# Patient Record
Sex: MALE | Race: WHITE | NOT HISPANIC OR LATINO | Employment: OTHER | URBAN - METROPOLITAN AREA
[De-identification: names, ages, dates, MRNs, and addresses within clinical notes are randomized per-mention and may not be internally consistent; named-entity substitution may affect disease eponyms.]

---

## 2017-01-05 ENCOUNTER — OFFICE VISIT (OUTPATIENT)
Dept: SURGERY | Facility: CLINIC | Age: 72
End: 2017-01-05
Payer: MEDICARE

## 2017-01-05 VITALS
DIASTOLIC BLOOD PRESSURE: 58 MMHG | WEIGHT: 199.94 LBS | HEART RATE: 79 BPM | BODY MASS INDEX: 28.69 KG/M2 | SYSTOLIC BLOOD PRESSURE: 105 MMHG

## 2017-01-05 DIAGNOSIS — Z93.2 ILEOSTOMY STATUS: ICD-10-CM

## 2017-01-05 DIAGNOSIS — Z09 POSTOP CHECK: Primary | ICD-10-CM

## 2017-01-05 PROCEDURE — 99024 POSTOP FOLLOW-UP VISIT: CPT | Mod: ,,, | Performed by: SURGERY

## 2017-01-05 PROCEDURE — 99999 PR PBB SHADOW E&M-EST. PATIENT-LVL III: CPT | Mod: PBBFAC,,, | Performed by: SURGERY

## 2017-01-05 PROCEDURE — 99213 OFFICE O/P EST LOW 20 MIN: CPT | Mod: PBBFAC,PO | Performed by: SURGERY

## 2017-01-05 RX ORDER — METRONIDAZOLE 500 MG/100ML
500 INJECTION, SOLUTION INTRAVENOUS
Status: CANCELLED | OUTPATIENT
Start: 2017-01-05

## 2017-01-05 RX ORDER — SODIUM CHLORIDE 9 MG/ML
INJECTION, SOLUTION INTRAVENOUS CONTINUOUS
Status: CANCELLED | OUTPATIENT
Start: 2017-01-05

## 2017-01-05 NOTE — MR AVS SNAPSHOT
[facility]  [address] [facility] [address]  [place] [address]  Phone: [phone]                  Chester Quinones   2017 3:30 PM   Office Visit    Description:  Male : 1945   Provider:  Hany Pierre MD   Department:  [facility] Surgery           Diagnoses this Visit        Comments    Postop check    -  Primary            To Do List           Future Appointments        Provider Department Dept Phone    2017 12:00 PM LAB, [facility] - Laboratory [phone]      Goals (5 Years of Data)     None      [facility]     [facility] On Call Nurse Care Line -  Assistance  Registered nurses in the [facility] On Call Center provide clinical advisement, health education, appointment booking, and other advisory services.  Call for this free service at [phone].             Medications                Verify that the below list of medications is an accurate representation of the medications you are currently taking.  If none reported, the list may be blank. If incorrect, please contact your healthcare provider. Carry this list with you in case of emergency.           Current Medications     aspirin (ECOTRIN) 81 MG EC tablet Take 81 mg by mouth nightly.     bicalutamide (CASODEX) 50 MG Tab Take 50 mg by mouth once daily.    carbamazepine (TEGRETOL) 200 mg tablet Take 200 mg by mouth 3 (three) times daily as needed.     cyanocobalamin 500 MCG tablet Take 1,000 mcg by mouth once daily.     diazePAM (VALIUM) 10 MG Tab Take 1 tablet (10 mg total) by mouth every 12 (twelve) hours. Takes 5 mg am and 10 mg pm    enzalutamide (XTANDI) 40 mg Cap Take 40 Doses by mouth once daily. 4 caps    fluoxetine (PROZAC) 20 MG capsule Take 20 mg by mouth every evening.     folic acid (FOLVITE) 400 MCG tablet Take 400 mcg by mouth once daily.    gabapentin (NEURONTIN) 600 MG tablet Take 600 mg by mouth 3 (three) times daily. Use prior to surgery    hydrocodone-acetaminophen  5-325mg (NORCO) 5-325 mg per tablet Take 1 tablet by mouth every 4 (four) hours as needed.    leuprolide, 6 month, (ELIGARD) 45 mg (6 month) injection Inject 45 mg into the skin every 3 (three) months.    metoprolol succinate (TOPROL-XL) 25 MG 24 hr tablet Take 25 mg by mouth 2 (two) times daily. 1/2 tab    nitroGLYCERIN (NITROSTAT) 0.4 MG SL tablet Place 0.4 mg under the tongue every 5 (five) minutes as needed for Chest pain.    omeprazole (PRILOSEC) 40 MG capsule Take 40 mg by mouth once daily. Use prior to surgery    oxybutynin (DITROPAN) 5 MG Tab Take 5 mg by mouth 2 (two) times daily. Use prior to surgery    pyridoxine (B-6) 25 MG Tab Take 25 mg by mouth once daily.    pyridoxine, vitamin B6, (VITAMIN B-6) 100 MG Tab Take 100 mg by mouth 2 (two) times daily.    simvastatin (ZOCOR) 20 MG tablet Take 20 mg by mouth every evening. Use another dose in AM for surgery    sulfamethoxazole-trimethoprim 400-80mg (BACTRIM,SEPTRA) 400-80 mg per tablet Take 1 tablet by mouth once daily.     tamsulosin (FLOMAX) 0.4 mg Cp24 Take 0.4 mg by mouth as needed.     temazepam (RESTORIL) 30 mg capsule Take 30 mg by mouth nightly as needed for Insomnia.    amlodipine (NORVASC) 5 MG tablet Take 5 mg by mouth once daily. Use prior to surgery    losartan-hydrochlorothiazide 100-12.5 mg (HYZAAR) 100-12.5 mg Tab Take 1 tablet by mouth once daily. Hold AM of surgery           Clinical Reference Information           Vital Signs - Last Recorded  Most recent update: 1/5/2017  3:19 PM by Hany Pierre MD    BP Pulse Wt BMI       (!) 105/58 79 90.7 kg (199 lb 15.3 oz) 28.69 kg/m2       Blood Pressure          Most Recent Value    BP  (!)  105/58      Allergies as of 1/5/2017     Niacin      Immunizations Administered on Date of Encounter - 1/5/2017     None      Orders Placed During Today's Visit      Normal Orders This Visit    Case Request Operating Room: CLOSURE-ILEOSTOMY       Instructions    Surgery is scheduled for 01/09/17 arrival  time per the preop nurse.  Preop will call from [phone]  Fasting after midnight  Someone to drive you home

## 2017-01-05 NOTE — PROGRESS NOTES
Subjective:       Patient ID: Chester Quinones is a 71 y.o. male.    Chief Complaint: No chief complaint on file.    HPI  72 yo M whom I am familiar with. He is 6 weeks s/p LAR with diverting loop ileostomy for rectal cancer.  Pt presents to discuss reversal.  He is feeling well.  No pain.  Energy and control of ostomy improved with lomotil  Review of Systems   Constitutional: Negative for activity change, appetite change, diaphoresis, fever and unexpected weight change.   Respiratory: Negative for cough, chest tightness and wheezing.    Cardiovascular: Negative for chest pain and palpitations.   Gastrointestinal: Negative for abdominal distention, abdominal pain, anal bleeding, blood in stool, constipation, diarrhea, nausea, rectal pain and vomiting.   Genitourinary: Negative for difficulty urinating, dysuria and hematuria.   Musculoskeletal: Negative for back pain and gait problem.   Neurological: Negative for tremors and seizures.       Objective:      Physical Exam   Constitutional: He is oriented to person, place, and time. He appears well-developed and well-nourished.   HENT:   Head: Normocephalic and atraumatic.   Eyes: Pupils are equal, round, and reactive to light.   Neck: Normal range of motion. Neck supple. No tracheal deviation present. No thyromegaly present.   Cardiovascular: Normal rate, regular rhythm and normal heart sounds.    Pulmonary/Chest: Effort normal and breath sounds normal.   Abdominal: Soft. Bowel sounds are normal. He exhibits no distension and no mass. There is no tenderness. There is no rebound and no guarding. Hernia confirmed negative in the right inguinal area and confirmed negative in the left inguinal area.       Genitourinary: Rectal exam shows no external hemorrhoid, no internal hemorrhoid, no fissure, no mass, no tenderness, anal tone normal and guaiac negative stool.   Neurological: He is alert and oriented to person, place, and time.   Psychiatric: He has a normal mood and  affect.   Vitals reviewed.        Path:  FINAL PATHOLOGIC DIAGNOSIS  1. Rectosigmoid colon, low anterior resection:  INVASIVE ADENOCARCINOMA, LOW-GRADE, 2.8 CM (PATHOLOGIC STAGING: pT2 pN0). SEE SYNOPTIC  REPORT.  NEGATIVE MARGINS OF RESECTION.  TWENTY-SIX LYMPH NODES NEGATIVE FOR METASTATIC CARCINOMA (0/26).    BE: negative for leak or contrast extravastation  Assessment:     Rectal cancer    No diagnosis found.    Plan:       TO OR on Monday for ileostomy reversal.    Risks and benefits of surgery d/w pt and informed consent obtained.

## 2017-01-06 ENCOUNTER — ANESTHESIA EVENT (OUTPATIENT)
Dept: SURGERY | Facility: HOSPITAL | Age: 72
DRG: 331 | End: 2017-01-06
Payer: MEDICARE

## 2017-01-09 ENCOUNTER — HOSPITAL ENCOUNTER (INPATIENT)
Facility: HOSPITAL | Age: 72
LOS: 2 days | Discharge: HOME | DRG: 331 | End: 2017-01-11
Attending: SURGERY | Admitting: SURGERY
Payer: MEDICARE

## 2017-01-09 ENCOUNTER — SURGERY (OUTPATIENT)
Age: 72
End: 2017-01-09

## 2017-01-09 ENCOUNTER — ANESTHESIA (OUTPATIENT)
Dept: SURGERY | Facility: HOSPITAL | Age: 72
DRG: 331 | End: 2017-01-09
Payer: MEDICARE

## 2017-01-09 DIAGNOSIS — Z93.2 STATUS POST ILEOSTOMY: ICD-10-CM

## 2017-01-09 DIAGNOSIS — Z09 POSTOP CHECK: ICD-10-CM

## 2017-01-09 DIAGNOSIS — N35.914 ANTERIOR URETHRAL STRICTURE: Primary | ICD-10-CM

## 2017-01-09 DIAGNOSIS — C20 RECTAL CANCER: ICD-10-CM

## 2017-01-09 DIAGNOSIS — C20 MALIGNANT TUMOR OF RECTUM: ICD-10-CM

## 2017-01-09 PROCEDURE — 37000008 HC ANESTHESIA 1ST 15 MINUTES: Performed by: SURGERY

## 2017-01-09 PROCEDURE — 63600175 PHARM REV CODE 636 W HCPCS: Performed by: ANESTHESIOLOGY

## 2017-01-09 PROCEDURE — 71000033 HC RECOVERY, INTIAL HOUR: Performed by: SURGERY

## 2017-01-09 PROCEDURE — 94761 N-INVAS EAR/PLS OXIMETRY MLT: CPT

## 2017-01-09 PROCEDURE — 94799 UNLISTED PULMONARY SVC/PX: CPT

## 2017-01-09 PROCEDURE — 99900104 DSU ONLY-NO CHARGE-EA ADD'L HR (STAT): Performed by: SURGERY

## 2017-01-09 PROCEDURE — 25000003 PHARM REV CODE 250: Performed by: SURGERY

## 2017-01-09 PROCEDURE — 44620 REPAIR BOWEL OPENING: CPT | Mod: 58,,, | Performed by: SURGERY

## 2017-01-09 PROCEDURE — 25000003 PHARM REV CODE 250: Performed by: ANESTHESIOLOGY

## 2017-01-09 PROCEDURE — 0DBB0ZZ EXCISION OF ILEUM, OPEN APPROACH: ICD-10-PCS | Performed by: SURGERY

## 2017-01-09 PROCEDURE — D9220A PRA ANESTHESIA: Mod: CRNA,,, | Performed by: NURSE ANESTHETIST, CERTIFIED REGISTERED

## 2017-01-09 PROCEDURE — 71000039 HC RECOVERY, EACH ADD'L HOUR: Performed by: SURGERY

## 2017-01-09 PROCEDURE — 27000221 HC OXYGEN, UP TO 24 HOURS

## 2017-01-09 PROCEDURE — 99900103 DSU ONLY-NO CHARGE-INITIAL HR (STAT): Performed by: SURGERY

## 2017-01-09 PROCEDURE — 36000706: Performed by: SURGERY

## 2017-01-09 PROCEDURE — D9220A PRA ANESTHESIA: Mod: ANES,,, | Performed by: ANESTHESIOLOGY

## 2017-01-09 PROCEDURE — 88305 TISSUE EXAM BY PATHOLOGIST: CPT | Performed by: PATHOLOGY

## 2017-01-09 PROCEDURE — 12000002 HC ACUTE/MED SURGE SEMI-PRIVATE ROOM

## 2017-01-09 PROCEDURE — 63600175 PHARM REV CODE 636 W HCPCS: Performed by: SURGERY

## 2017-01-09 PROCEDURE — 37000009 HC ANESTHESIA EA ADD 15 MINS: Performed by: SURGERY

## 2017-01-09 PROCEDURE — 27201423 OPTIME MED/SURG SUP & DEVICES STERILE SUPPLY: Performed by: SURGERY

## 2017-01-09 PROCEDURE — C1765 ADHESION BARRIER: HCPCS | Performed by: SURGERY

## 2017-01-09 PROCEDURE — 36000707: Performed by: SURGERY

## 2017-01-09 PROCEDURE — 25000003 PHARM REV CODE 250: Performed by: NURSE ANESTHETIST, CERTIFIED REGISTERED

## 2017-01-09 PROCEDURE — 63600175 PHARM REV CODE 636 W HCPCS: Performed by: NURSE ANESTHETIST, CERTIFIED REGISTERED

## 2017-01-09 DEVICE — MEMBRANE SEPRAFILM 5 X 6: Type: IMPLANTABLE DEVICE | Site: ABDOMEN | Status: FUNCTIONAL

## 2017-01-09 RX ORDER — DIAZEPAM 5 MG/1
10 TABLET ORAL EVERY 12 HOURS
Status: DISCONTINUED | OUTPATIENT
Start: 2017-01-09 | End: 2017-01-11 | Stop reason: HOSPADM

## 2017-01-09 RX ORDER — NITROGLYCERIN 0.4 MG/1
0.4 TABLET SUBLINGUAL EVERY 5 MIN PRN
Status: DISCONTINUED | OUTPATIENT
Start: 2017-01-09 | End: 2017-01-11 | Stop reason: HOSPADM

## 2017-01-09 RX ORDER — ONDANSETRON HYDROCHLORIDE 2 MG/ML
INJECTION, SOLUTION INTRAMUSCULAR; INTRAVENOUS
Status: DISCONTINUED | OUTPATIENT
Start: 2017-01-09 | End: 2017-01-09

## 2017-01-09 RX ORDER — GLYCOPYRROLATE 0.2 MG/ML
INJECTION INTRAMUSCULAR; INTRAVENOUS
Status: DISCONTINUED | OUTPATIENT
Start: 2017-01-09 | End: 2017-01-09

## 2017-01-09 RX ORDER — PROPOFOL 10 MG/ML
VIAL (ML) INTRAVENOUS
Status: DISCONTINUED | OUTPATIENT
Start: 2017-01-09 | End: 2017-01-09

## 2017-01-09 RX ORDER — LORAZEPAM 2 MG/ML
0.25 INJECTION INTRAMUSCULAR
Status: DISCONTINUED | OUTPATIENT
Start: 2017-01-09 | End: 2017-01-09 | Stop reason: HOSPADM

## 2017-01-09 RX ORDER — HYDROMORPHONE HYDROCHLORIDE 2 MG/ML
1 INJECTION, SOLUTION INTRAMUSCULAR; INTRAVENOUS; SUBCUTANEOUS EVERY 4 HOURS PRN
Status: DISCONTINUED | OUTPATIENT
Start: 2017-01-09 | End: 2017-01-11 | Stop reason: HOSPADM

## 2017-01-09 RX ORDER — HYDROCODONE BITARTRATE AND ACETAMINOPHEN 5; 325 MG/1; MG/1
1 TABLET ORAL EVERY 4 HOURS PRN
Status: DISCONTINUED | OUTPATIENT
Start: 2017-01-09 | End: 2017-01-11 | Stop reason: HOSPADM

## 2017-01-09 RX ORDER — SODIUM CHLORIDE, SODIUM LACTATE, POTASSIUM CHLORIDE, CALCIUM CHLORIDE 600; 310; 30; 20 MG/100ML; MG/100ML; MG/100ML; MG/100ML
50 INJECTION, SOLUTION INTRAVENOUS CONTINUOUS
Status: ACTIVE | OUTPATIENT
Start: 2017-01-09 | End: 2017-01-09

## 2017-01-09 RX ORDER — ONDANSETRON 2 MG/ML
4 INJECTION INTRAMUSCULAR; INTRAVENOUS DAILY PRN
Status: DISCONTINUED | OUTPATIENT
Start: 2017-01-09 | End: 2017-01-09 | Stop reason: HOSPADM

## 2017-01-09 RX ORDER — FENTANYL CITRATE 50 UG/ML
INJECTION, SOLUTION INTRAMUSCULAR; INTRAVENOUS
Status: DISCONTINUED | OUTPATIENT
Start: 2017-01-09 | End: 2017-01-09

## 2017-01-09 RX ORDER — OXYBUTYNIN CHLORIDE 5 MG/1
5 TABLET ORAL 2 TIMES DAILY
Status: DISCONTINUED | OUTPATIENT
Start: 2017-01-09 | End: 2017-01-11 | Stop reason: HOSPADM

## 2017-01-09 RX ORDER — METRONIDAZOLE 500 MG/100ML
500 INJECTION, SOLUTION INTRAVENOUS
Status: COMPLETED | OUTPATIENT
Start: 2017-01-09 | End: 2017-01-09

## 2017-01-09 RX ORDER — ENOXAPARIN SODIUM 100 MG/ML
40 INJECTION SUBCUTANEOUS EVERY 24 HOURS
Status: DISCONTINUED | OUTPATIENT
Start: 2017-01-10 | End: 2017-01-11 | Stop reason: HOSPADM

## 2017-01-09 RX ORDER — MEPERIDINE HYDROCHLORIDE 25 MG/ML
12.5 INJECTION INTRAMUSCULAR; INTRAVENOUS; SUBCUTANEOUS ONCE AS NEEDED
Status: DISCONTINUED | OUTPATIENT
Start: 2017-01-09 | End: 2017-01-09 | Stop reason: HOSPADM

## 2017-01-09 RX ORDER — ZOLPIDEM TARTRATE 5 MG/1
5 TABLET ORAL NIGHTLY
Status: DISCONTINUED | OUTPATIENT
Start: 2017-01-09 | End: 2017-01-11 | Stop reason: HOSPADM

## 2017-01-09 RX ORDER — SODIUM CHLORIDE 9 MG/ML
INJECTION, SOLUTION INTRAVENOUS CONTINUOUS
Status: DISCONTINUED | OUTPATIENT
Start: 2017-01-09 | End: 2017-01-09

## 2017-01-09 RX ORDER — NEOSTIGMINE METHYLSULFATE 1 MG/ML
INJECTION, SOLUTION INTRAVENOUS
Status: DISCONTINUED | OUTPATIENT
Start: 2017-01-09 | End: 2017-01-09

## 2017-01-09 RX ORDER — LIDOCAINE HCL/PF 100 MG/5ML
SYRINGE (ML) INTRAVENOUS
Status: DISCONTINUED | OUTPATIENT
Start: 2017-01-09 | End: 2017-01-09

## 2017-01-09 RX ORDER — ONDANSETRON 2 MG/ML
4 INJECTION INTRAMUSCULAR; INTRAVENOUS EVERY 12 HOURS PRN
Status: DISCONTINUED | OUTPATIENT
Start: 2017-01-09 | End: 2017-01-11 | Stop reason: HOSPADM

## 2017-01-09 RX ORDER — SUCCINYLCHOLINE CHLORIDE 20 MG/ML
INJECTION INTRAMUSCULAR; INTRAVENOUS
Status: DISCONTINUED | OUTPATIENT
Start: 2017-01-09 | End: 2017-01-09

## 2017-01-09 RX ORDER — DEXTROSE, SODIUM CHLORIDE, SODIUM LACTATE, POTASSIUM CHLORIDE, AND CALCIUM CHLORIDE 5; .6; .31; .03; .02 G/100ML; G/100ML; G/100ML; G/100ML; G/100ML
INJECTION, SOLUTION INTRAVENOUS CONTINUOUS
Status: DISCONTINUED | OUTPATIENT
Start: 2017-01-09 | End: 2017-01-11 | Stop reason: HOSPADM

## 2017-01-09 RX ORDER — HYDROMORPHONE HYDROCHLORIDE 2 MG/ML
0.2 INJECTION, SOLUTION INTRAMUSCULAR; INTRAVENOUS; SUBCUTANEOUS EVERY 5 MIN PRN
Status: DISCONTINUED | OUTPATIENT
Start: 2017-01-09 | End: 2017-01-09 | Stop reason: HOSPADM

## 2017-01-09 RX ORDER — LIDOCAINE HYDROCHLORIDE 10 MG/ML
1 INJECTION, SOLUTION EPIDURAL; INFILTRATION; INTRACAUDAL; PERINEURAL ONCE
Status: COMPLETED | OUTPATIENT
Start: 2017-01-09 | End: 2017-01-09

## 2017-01-09 RX ORDER — DIPHENHYDRAMINE HYDROCHLORIDE 50 MG/ML
12.5 INJECTION INTRAMUSCULAR; INTRAVENOUS EVERY 4 HOURS PRN
Status: DISCONTINUED | OUTPATIENT
Start: 2017-01-09 | End: 2017-01-11 | Stop reason: HOSPADM

## 2017-01-09 RX ORDER — BICALUTAMIDE 50 MG/1
50 TABLET, FILM COATED ORAL NIGHTLY
Status: DISCONTINUED | OUTPATIENT
Start: 2017-01-09 | End: 2017-01-11 | Stop reason: HOSPADM

## 2017-01-09 RX ORDER — METRONIDAZOLE 500 MG/100ML
500 INJECTION, SOLUTION INTRAVENOUS
Status: COMPLETED | OUTPATIENT
Start: 2017-01-09 | End: 2017-01-10

## 2017-01-09 RX ORDER — LORAZEPAM 2 MG/ML
0.25 INJECTION INTRAMUSCULAR EVERY 4 HOURS PRN
Status: DISCONTINUED | OUTPATIENT
Start: 2017-01-09 | End: 2017-01-11 | Stop reason: HOSPADM

## 2017-01-09 RX ORDER — ROCURONIUM BROMIDE 10 MG/ML
INJECTION, SOLUTION INTRAVENOUS
Status: DISCONTINUED | OUTPATIENT
Start: 2017-01-09 | End: 2017-01-09

## 2017-01-09 RX ADMIN — PROPOFOL 200 MG: 10 INJECTION, EMULSION INTRAVENOUS at 12:01

## 2017-01-09 RX ADMIN — SODIUM CHLORIDE, SODIUM GLUCONATE, SODIUM ACETATE, POTASSIUM CHLORIDE, MAGNESIUM CHLORIDE, SODIUM PHOSPHATE, DIBASIC, AND POTASSIUM PHOSPHATE: .53; .5; .37; .037; .03; .012; .00082 INJECTION, SOLUTION INTRAVENOUS at 01:01

## 2017-01-09 RX ADMIN — HYDROMORPHONE HYDROCHLORIDE 0.2 MG: 2 INJECTION, SOLUTION INTRAMUSCULAR; INTRAVENOUS; SUBCUTANEOUS at 02:01

## 2017-01-09 RX ADMIN — GLYCOPYRROLATE 0.4 MG: 0.2 INJECTION, SOLUTION INTRAMUSCULAR; INTRAVENOUS at 01:01

## 2017-01-09 RX ADMIN — CEFTRIAXONE 2 G: 2 INJECTION, SOLUTION INTRAVENOUS at 12:01

## 2017-01-09 RX ADMIN — SODIUM CHLORIDE, SODIUM GLUCONATE, SODIUM ACETATE, POTASSIUM CHLORIDE, MAGNESIUM CHLORIDE, SODIUM PHOSPHATE, DIBASIC, AND POTASSIUM PHOSPHATE: .53; .5; .37; .037; .03; .012; .00082 INJECTION, SOLUTION INTRAVENOUS at 12:01

## 2017-01-09 RX ADMIN — HYDROCODONE BITARTRATE AND ACETAMINOPHEN 1 TABLET: 5; 325 TABLET ORAL at 09:01

## 2017-01-09 RX ADMIN — ONDANSETRON 4 MG: 2 INJECTION, SOLUTION INTRAMUSCULAR; INTRAVENOUS at 12:01

## 2017-01-09 RX ADMIN — SUCCINYLCHOLINE CHLORIDE 120 MG: 20 INJECTION, SOLUTION INTRAMUSCULAR; INTRAVENOUS at 12:01

## 2017-01-09 RX ADMIN — BUPIVACAINE 20 ML: 13.3 INJECTION, SUSPENSION, LIPOSOMAL INFILTRATION at 01:01

## 2017-01-09 RX ADMIN — ROCURONIUM BROMIDE 15 MG: 10 INJECTION, SOLUTION INTRAVENOUS at 12:01

## 2017-01-09 RX ADMIN — SODIUM CHLORIDE, SODIUM LACTATE, POTASSIUM CHLORIDE, AND CALCIUM CHLORIDE 50 ML: .6; .31; .03; .02 INJECTION, SOLUTION INTRAVENOUS at 02:01

## 2017-01-09 RX ADMIN — ROCURONIUM BROMIDE 5 MG: 10 INJECTION, SOLUTION INTRAVENOUS at 12:01

## 2017-01-09 RX ADMIN — SODIUM CHLORIDE, SODIUM LACTATE, POTASSIUM CHLORIDE, AND CALCIUM CHLORIDE 50 ML: .6; .31; .03; .02 INJECTION, SOLUTION INTRAVENOUS at 08:01

## 2017-01-09 RX ADMIN — OXYBUTYNIN CHLORIDE 5 MG: 5 TABLET ORAL at 09:01

## 2017-01-09 RX ADMIN — LIDOCAINE HYDROCHLORIDE 100 MG: 20 INJECTION, SOLUTION INTRAVENOUS at 12:01

## 2017-01-09 RX ADMIN — ZOLPIDEM TARTRATE 5 MG: 5 TABLET, FILM COATED ORAL at 09:01

## 2017-01-09 RX ADMIN — NEOSTIGMINE METHYLSULFATE 3 MG: 1 INJECTION INTRAVENOUS at 01:01

## 2017-01-09 RX ADMIN — DEXTROSE, SODIUM CHLORIDE, SODIUM LACTATE, POTASSIUM CHLORIDE, AND CALCIUM CHLORIDE: 5; .6; .31; .03; .02 INJECTION, SOLUTION INTRAVENOUS at 04:01

## 2017-01-09 RX ADMIN — DIAZEPAM 10 MG: 5 TABLET ORAL at 09:01

## 2017-01-09 RX ADMIN — METRONIDAZOLE 500 MG: 500 INJECTION, SOLUTION INTRAVENOUS at 09:01

## 2017-01-09 RX ADMIN — FENTANYL CITRATE 150 MCG: 50 INJECTION, SOLUTION INTRAMUSCULAR; INTRAVENOUS at 12:01

## 2017-01-09 RX ADMIN — METRONIDAZOLE 500 MG: 500 INJECTION, SOLUTION INTRAVENOUS at 01:01

## 2017-01-09 RX ADMIN — FENTANYL CITRATE 50 MCG: 50 INJECTION, SOLUTION INTRAMUSCULAR; INTRAVENOUS at 12:01

## 2017-01-09 RX ADMIN — LIDOCAINE HYDROCHLORIDE 10 MG: 10 INJECTION, SOLUTION EPIDURAL; INFILTRATION; INTRACAUDAL; PERINEURAL at 08:01

## 2017-01-09 RX ADMIN — BICALUTAMIDE 50 MG: 50 TABLET, FILM COATED ORAL at 09:01

## 2017-01-09 RX ADMIN — GLYCOPYRROLATE 0.2 MG: 0.2 INJECTION, SOLUTION INTRAMUSCULAR; INTRAVENOUS at 01:01

## 2017-01-09 NOTE — ANESTHESIA PREPROCEDURE EVALUATION
01/09/2017  Chester Quinones is a 71 y.o., male.    [facility] Anesthesia Evaluation    I have reviewed the Patient Summary Reports.    I have reviewed the Nursing Notes.      Review of Systems  Anesthesia Hx:  No problems with previous Anesthesia    Cardiovascular:   Hypertension, well controlled Past MI CAD asymptomatic CABG/stent  CAD - MI > 10 years ago with stent - stable   Pulmonary:   COPD, mild    Musculoskeletal:   Pt claims he has been feeling weak since his ileostomy surgeyr - ambulatory but sometimes uses wheelchair       Physical Exam  General:  Well nourished                 Anesthesia Plan  Type of Anesthesia, risks & benefits discussed:  Anesthesia Type:  general  Patient's Preference:   Intra-op Monitoring Plan:   Intra-op Monitoring Plan Comments:   Post Op Pain Control Plan:   Post Op Pain Control Plan Comments:   Induction:   IV  Beta Blocker:  Patient is not currently on a Beta-Blocker (No further documentation required).       Informed Consent: Patient understands risks and agrees with Anesthesia plan.  Questions answered. Anesthesia consent signed with patient.  ASA Score: 3     Day of Surgery Review of History & Physical:    H&P update referred to the surgeon.         Ready For Surgery From Anesthesia Perspective.

## 2017-01-09 NOTE — INTERVAL H&P NOTE
The patient has been examined and the H&P has been reviewed:    I concur with the findings and no changes have occurred since H&P was written.    Anesthesia/Surgery risks, benefits and alternative options discussed and understood by patient/family.          Active Hospital Problems    Diagnosis  POA    Postop check [Z09]  Yes      Resolved Hospital Problems    Diagnosis Date Resolved POA   No resolved problems to display.

## 2017-01-09 NOTE — TRANSFER OF CARE
"Anesthesia Transfer of Care Note    Patient: Chester Quinones    Procedure(s) Performed: Procedure(s) (LRB):  CLOSURE-ILEOSTOMY (N/A)    Patient location: PACU    Anesthesia Type: general    Transport from OR: Transported from OR on 2-3 L/min O2 by NC with adequate spontaneous ventilation    Post pain: adequate analgesia    Post assessment: no apparent anesthetic complications and tolerated procedure well    Post vital signs: stable    Level of consciousness: awake    Nausea/Vomiting: no nausea/vomiting    Complications: none          Last vitals:   Visit Vitals    /65    Pulse (!) 54    Temp 36.6 °C (97.9 °F) (Oral)    Resp 16    Ht 5' 10" (1.778 m)    Wt 88.9 kg (196 lb)    SpO2 100%    BMI 28.12 kg/m2     "

## 2017-01-09 NOTE — IP AVS SNAPSHOT
[address] [place] [address]  Phone: [phone]           I have received a copy of my After Visit Summary and discharge instructions from [facility].    INSTRUCTIONS RECEIVED AND UNDERSTOOD BY:                     Patient/Patient Representative: ________________________________________________________________     Date/Time: ________________________________________________________________                     Instructions Given By: ________________________________________________________________     Date/Time: ________________________________________________________________

## 2017-01-09 NOTE — H&P (VIEW-ONLY)
Subjective:       Patient ID: Chester Quinones is a 71 y.o. male.    Chief Complaint: No chief complaint on file.    HPI  70 yo M whom I am familiar with. He is 6 weeks s/p LAR with diverting loop ileostomy for rectal cancer.  Pt presents to discuss reversal.  He is feeling well.  No pain.  Energy and control of ostomy improved with lomotil  Review of Systems   Constitutional: Negative for activity change, appetite change, diaphoresis, fever and unexpected weight change.   Respiratory: Negative for cough, chest tightness and wheezing.    Cardiovascular: Negative for chest pain and palpitations.   Gastrointestinal: Negative for abdominal distention, abdominal pain, anal bleeding, blood in stool, constipation, diarrhea, nausea, rectal pain and vomiting.   Genitourinary: Negative for difficulty urinating, dysuria and hematuria.   Musculoskeletal: Negative for back pain and gait problem.   Neurological: Negative for tremors and seizures.       Objective:      Physical Exam   Constitutional: He is oriented to person, place, and time. He appears well-developed and well-nourished.   HENT:   Head: Normocephalic and atraumatic.   Eyes: Pupils are equal, round, and reactive to light.   Neck: Normal range of motion. Neck supple. No tracheal deviation present. No thyromegaly present.   Cardiovascular: Normal rate, regular rhythm and normal heart sounds.    Pulmonary/Chest: Effort normal and breath sounds normal.   Abdominal: Soft. Bowel sounds are normal. He exhibits no distension and no mass. There is no tenderness. There is no rebound and no guarding. Hernia confirmed negative in the right inguinal area and confirmed negative in the left inguinal area.       Genitourinary: Rectal exam shows no external hemorrhoid, no internal hemorrhoid, no fissure, no mass, no tenderness, anal tone normal and guaiac negative stool.   Neurological: He is alert and oriented to person, place, and time.   Psychiatric: He has a normal mood and  affect.   Vitals reviewed.        Path:  FINAL PATHOLOGIC DIAGNOSIS  1. Rectosigmoid colon, low anterior resection:  INVASIVE ADENOCARCINOMA, LOW-GRADE, 2.8 CM (PATHOLOGIC STAGING: pT2 pN0). SEE SYNOPTIC  REPORT.  NEGATIVE MARGINS OF RESECTION.  TWENTY-SIX LYMPH NODES NEGATIVE FOR METASTATIC CARCINOMA (0/26).    BE: negative for leak or contrast extravastation  Assessment:     Rectal cancer    No diagnosis found.    Plan:       TO OR on Monday for ileostomy reversal.    Risks and benefits of surgery d/w pt and informed consent obtained.

## 2017-01-09 NOTE — PLAN OF CARE
Pt in pre op. Denies pain has urostomy on right side of abd. Has supra pubic cath with leg bag on left side.  Wife at bedside. willcontinur to monitor

## 2017-01-09 NOTE — PLAN OF CARE
01/09/17 1714   Vitals   Pulse (!) 59   SpO2 100 %   Pulse Oximetry Type Intermittent   All Fields Breath Sounds clear   Positioning HOB elevated 30 degrees   Incentive Spirometer   Predicted Level (mL) (Incentive Spirometer) 2900   $ Administration (Incentive Spirometer) done with encouragement   Number of Repetitions (Incentive Spirometer) 10   Level (mL) (Incentive Spirometer) 2000   Pt assessed, no distress noted. Post op instruction for IS, performs IS well.

## 2017-01-09 NOTE — BRIEF OP NOTE
[facility]  Brief Operative Note     SUMMARY     Surgery Date: 1/9/2017     Surgeon(s) and Role:     * Hany Pierre MD - Primary    Assistant: Priscilla [place]    Pre-op Diagnosis:  Ileostomy status [Z93.2]    Post-op Diagnosis:  Post-Op Diagnosis Codes:     * Ileostomy status [Z93.2]    Procedure(s) (LRB):  CLOSURE-ILEOSTOMY (N/A)   Small bowel resection    Anesthesia: General    Description of the findings of the procedure: Loop ileostomy.  Minimal intraabdominal adhesions    Findings/Key Components: see above.       Estimated Blood Loss: 25 cc's         Specimens:   Specimen (12h ago through future)    Start     Ordered    01/09/17 1338  Specimen to Pathology - Surgery  Once     Comments:  Pre-op Diagnosis: Ileostomy status [Z93.2]    Post-op Diagnosis: same  Procedure(s):  CLOSURE-ILEOSTOMY  EXPLORATORY-LAPAROTOMY     Number of specimens: 1    Name of specimens: ileostomy    01/09/17 2404

## 2017-01-09 NOTE — IP AVS SNAPSHOT
[address] [place] [address]  Phone: [phone]           Patient Discharge Instructions     Our goal is to set you up for success. This packet includes information on your condition, medications, and your home care. It will help you to care for yourself so you don't get sicker and need to go back to the hospital.     Please ask your nurse if you have any questions.        There are many details to remember when preparing to leave the hospital. Here is what you will need to do:    1. Take your medicine. If you are prescribed medications, review your Medication List in the following pages. You may have new medications to  at the pharmacy and others that you'll need to stop taking. Review the instructions for how and when to take your medications. Talk with your doctor or nurses if you are unsure of what to do.     2. Go to your follow-up appointments. Specific follow-up information is listed in the following pages. Your may be contacted by a transition nurse or clinical provider about future appointments. Be sure we have all of the phone numbers to reach you, if needed. Please contact your provider's office if you are unable to make an appointment.     3. Watch for warning signs. Your doctor or nurse will give you detailed warning signs to watch for and when to call for assistance. These instructions may also include educational information about your condition. If you experience any of warning signs to your health, call your doctor.               [facility]  Unless otherwise directed by your provider, please contact [facility], our nurse care line that is available for 24/7 assistance.     [phone] (toll-free)    Registered nurses in the [facility] provide clinical advisement, health education, appointment booking, and other advisory services.                    ** Verify the list of medication(s) below is accurate and up to date.  Carry this with you in case of emergency. If your medications have changed, please notify your healthcare provider.             Medication List      CHANGE how you take these medications        Additional Info                      * hydrocodone-acetaminophen 5-325mg 5-325 mg per tablet   Commonly known as:  NORCO   Quantity:  30 tablet   Refills:  0   Dose:  1 tablet   What changed:  Another medication with the same name was added. Make sure you understand how and when to take each.    Last time this was given:  1 tablet on 1/11/2017  8:39 AM   Instructions:  Take 1 tablet by mouth every 4 (four) hours as needed.     Begin Date    AM    Noon    PM    Bedtime       * hydrocodone-acetaminophen 5-325mg 5-325 mg per tablet   Commonly known as:  NORCO   Quantity:  30 tablet   Refills:  0   Dose:  1 tablet   What changed:  You were already taking a medication with the same name, and this prescription was added. Make sure you understand how and when to take each.    Last time this was given:  1 tablet on 1/11/2017  8:39 AM   Instructions:  Take 1 tablet by mouth every 4 (four) hours as needed.     Begin Date    AM    Noon    PM    Bedtime       * Notice:  This list has 2 medication(s) that are the same as other medications prescribed for you. Read the directions carefully, and ask your doctor or other care provider to review them with you.      CONTINUE taking these medications        Additional Info                      aspirin 81 MG EC tablet   Commonly known as:  ECOTRIN   Refills:  0   Dose:  81 mg    Instructions:  Take 81 mg by mouth nightly.     Begin Date    AM    Noon    PM    Bedtime       bicalutamide 50 MG Tab   Commonly known as:  CASODEX   Refills:  0   Dose:  50 mg    Last time this was given:  50 mg on 1/10/2017  9:00 PM   Instructions:  Take 50 mg by mouth once daily.     Begin Date    AM    Noon    PM    Bedtime       carbamazepine 200 mg tablet   Commonly known as:  TEGRETOL   Refills:  0   Dose:  200 mg    Indications:  Trigeminal Neuralgia    Instructions:  Take 200 mg by mouth 3 (three) times daily as needed.     Begin Date    AM    Noon    PM    Bedtime       cyanocobalamin 500 MCG tablet   Refills:  0   Dose:  1000 mcg    Instructions:  Take 1,000 mcg by mouth once daily.     Begin Date    AM    Noon    PM    Bedtime       diazePAM 10 MG Tab   Commonly known as:  VALIUM   Quantity:  60 tablet   Refills:  1   Dose:  10 mg    Last time this was given:  10 mg on 1/11/2017  8:39 AM   Instructions:  Take 1 tablet (10 mg total) by mouth every 12 (twelve) hours. Takes 5 mg am and 10 mg pm     Begin Date    AM    Noon    PM    Bedtime       fluoxetine 20 MG capsule   Commonly known as:  PROZAC   Refills:  4   Dose:  20 mg    Instructions:  Take 20 mg by mouth every evening.     Begin Date    AM    Noon    PM    Bedtime       folic acid 400 MCG tablet   Commonly known as:  FOLVITE   Refills:  0   Dose:  400 mcg    Instructions:  Take 400 mcg by mouth once daily.     Begin Date    AM    Noon    PM    Bedtime       gabapentin 600 MG tablet   Commonly known as:  NEURONTIN   Refills:  0   Dose:  600 mg    Instructions:  Take 600 mg by mouth 3 (three) times daily. Use prior to surgery     Begin Date    AM    Noon    PM    Bedtime       leuprolide (6 month) 45 mg injection   Commonly known as:  ELIGARD   Refills:  0   Dose:  45 mg   Indications:  advanced prostatic carcinoma    Instructions:  Inject 45 mg into the skin every 3 (three) months.     Begin Date    AM    Noon    PM    Bedtime       nitroGLYCERIN 0.4 MG SL tablet   Commonly known as:  NITROSTAT   Refills:  0   Dose:  0.4 mg    Instructions:  Place 0.4 mg under the tongue every 5 (five) minutes as needed for Chest pain.     Begin Date    AM    Noon    PM    Bedtime       omeprazole 40 MG capsule   Commonly known as:  PRILOSEC   Refills:  0   Dose:  40 mg    Instructions:  Take 40 mg by mouth once daily. Use prior to surgery     Begin Date    AM    Noon    PM     Bedtime       oxybutynin 5 MG Tab   Commonly known as:  DITROPAN   Refills:  0   Dose:  5 mg    Last time this was given:  5 mg on 1/11/2017  8:39 AM   Instructions:  Take 5 mg by mouth 2 (two) times daily. Use prior to surgery     Begin Date    AM    Noon    PM    Bedtime       * pyridoxine (vitamin B6) 25 MG Tab   Commonly known as:  B-6   Refills:  0   Dose:  25 mg    Instructions:  Take 25 mg by mouth once daily.     Begin Date    AM    Noon    PM    Bedtime       * VITAMIN B-6 100 MG Tab   Refills:  0   Dose:  100 mg   Generic drug:  pyridoxine (vitamin B6)    Instructions:  Take 100 mg by mouth 2 (two) times daily.     Begin Date    AM    Noon    PM    Bedtime       RESTORIL 30 mg capsule   Refills:  0   Dose:  30 mg   Generic drug:  temazepam    Instructions:  Take 30 mg by mouth nightly as needed for Insomnia.     Begin Date    AM    Noon    PM    Bedtime       simvastatin 20 MG tablet   Commonly known as:  ZOCOR   Refills:  0   Dose:  20 mg    Instructions:  Take 20 mg by mouth every evening. Use another dose in AM for surgery     Begin Date    AM    Noon    PM    Bedtime       sulfamethoxazole-trimethoprim 400-80mg 400-80 mg per tablet   Commonly known as:  BACTRIM,SEPTRA   Refills:  0   Dose:  1 tablet    Instructions:  Take 1 tablet by mouth once daily.     Begin Date    AM    Noon    PM    Bedtime       tamsulosin 0.4 mg Cp24   Commonly known as:  FLOMAX   Refills:  0   Dose:  0.4 mg    Instructions:  Take 0.4 mg by mouth as needed.     Begin Date    AM    Noon    PM    Bedtime       XTANDI 40 mg Cap   Refills:  0   Dose:  40 Dose   Generic drug:  enzalutamide    Last time this was given:  160 mg on 1/10/2017  9:00 PM   Instructions:  Take 40 Doses by mouth once daily. 4 caps     Begin Date    AM    Noon    PM    Bedtime       * Notice:  This list has 2 medication(s) that are the same as other medications prescribed for you. Read the directions carefully, and ask your doctor or other care provider to  review them with you.      ASK your doctor about these medications        Additional Info                      metoprolol succinate 25 MG 24 hr tablet   Commonly known as:  TOPROL-XL   Refills:  0   Dose:  25 mg    Instructions:  Take 25 mg by mouth 2 (two) times daily. 1/2 tab     Begin Date    AM    Noon    PM    Bedtime            Where to Get Your Medications      These medications were sent to [address] [address]     Phone:  [phone]     hydrocodone-acetaminophen 5-325mg 5-325 mg per tablet                  Please bring to all follow up appointments:    1. A copy of your discharge instructions.  2. All medicines you are currently taking in their original bottles.  3. Identification and insurance card.    Please arrive 15 minutes ahead of scheduled appointment time.    Please call 24 hours in advance if you must reschedule your appointment and/or time.        Your Scheduled Appointments     Feb 07, 2017 12:00 PM CST   Non-Fasting Lab with [facility] ([facility]) [address] [place] [address]   [phone]            Feb 07, 2017  1:00 PM CST   Established Patient Visit with Apolinar White MD   [facility] ([facility])    [address] [place] [id number] [phone]              Follow-up Information     Follow up with Hany Pierre MD In 2 weeks.    Specialties:  General Surgery, Colon and Rectal Surgery, Surgery    Contact information:    [address] [facility] [address]  [phone]          Discharge Instructions     Future Orders    Call MD for:  difficulty breathing or increased cough     Call MD for:  increased confusion or weakness     Call MD for:  persistent dizziness, light-headedness, or visual disturbances     Call MD for:  persistent nausea and vomiting or diarrhea     Call MD  "for:  redness, tenderness, or signs of infection (pain, swelling, redness, odor or green/yellow discharge around incision site)     Call MD for:  severe persistent headache     Call MD for:  severe uncontrolled pain     Call MD for:  temperature >100.4     Call MD for:  worsening rash     Diet general     Questions:    Total calories:      Fat restriction, if any:      Protein restriction, if any:      Na restriction, if any:      Fluid restriction:      Additional restrictions:      Lifting restrictions         Primary Diagnosis     Your primary diagnosis was:  Rectal Cancer      Admission Information     Date & Time Provider Department CSN    1/9/2017  7:40 AM Hany Pierre MD [facility] [id number]      Care Providers     Provider Role Specialty Primary office phone    Hany Pierre MD Attending Provider General Surgery [phone]    Hany Pierre MD Surgeon  General Surgery [phone]      Your Vitals Were     BP Pulse Temp Resp Height Weight    158/74 73 98.1 °F (36.7 °C) (Oral) 20 5' 10" (1.778 m) 88.9 kg (196 lb)    SpO2 BMI             98% 28.12 kg/m2         Recent Lab Values     No lab values to display.      Pending Labs     Order Current Status    Specimen to Pathology - Surgery In process      Allergies as of 1/11/2017        Reactions    Niacin Rash      Advance Directives     An advance directive is a document which, in the event you are no longer able to make decisions for yourself, tells your healthcare team what kind of treatment you do or do not want to receive, or who you would like to make those decisions for you.  If you do not currently have an advance directive, [facility] encourages you to create one.  For more information call:  [phone]), [phone] ([phone]),  or log on to [email or web address].        Smoking Cessation     If you would like to quit smoking:   You may be eligible for free services if you are a [place] resident and " started smoking cigarettes before September 1, 1988.  Call the Smoking Cessation Trust (SCT) toll free at (996) 112-1629 or (724) 586-1449.   Call 1-800-QUIT-NOW if you do not meet the above criteria.            Language Assistance Services     ATTENTION: Language assistance services are available, free of charge. Please call [phone].      ATENCIÓN: Si habla español, tiene a [place] disposición servicios gratuitos de asistencia lingüística. Llame al [phone].     CHÚ Ý: N?u b?n nói Ti?ng Vi?t, có các d?ch v? h? tr? ngôn ng? mi?n phí dành cho b?n. G?i s? [phone].         [facility] complies with applicable Federal civil rights laws and does not discriminate on the basis of race, color, national origin, age, disability, or sex.

## 2017-01-09 NOTE — PLAN OF CARE
Patient restiing quietly, c/o pain.  Patient medicated w/IV hydromorphone.  SP catheter to leg bag w/robe urine, adequate output.  Plan of care discussed w/patient and wife.  Awaiting private room placement.

## 2017-01-09 NOTE — PLAN OF CARE
1015- checked illeostomy bag, not much in it, but emptied urostomy bag of 100 cc clear yellow urine, before going back to OR  1130- emptied urostomy bag of 100 cc clear yellow urine

## 2017-01-09 NOTE — ANESTHESIA POSTPROCEDURE EVALUATION
"Anesthesia Post Evaluation    Patient: Chester Quinones    Procedure(s) Performed: Procedure(s) (LRB):  CLOSURE-ILEOSTOMY (N/A)    Final Anesthesia Type: general  Patient location during evaluation: PACU  Patient participation: Yes- Able to Participate  Level of consciousness: awake and alert  Post-procedure vital signs: reviewed and stable  Pain management: adequate  Airway patency: patent  PONV status at discharge: No PONV  Anesthetic complications: no      Cardiovascular status: blood pressure returned to baseline and hemodynamically stable  Respiratory status: unassisted  Hydration status: euvolemic  Follow-up not needed.        Visit Vitals    /65    Pulse (!) 54    Temp 36.6 °C (97.9 °F) (Oral)    Resp 16    Ht 5' 10" (1.778 m)    Wt 88.9 kg (196 lb)    SpO2 100%    BMI 28.12 kg/m2       Pain/Gudelia Score: Pain Assessment Performed: Yes (1/9/2017  8:29 AM)  Presence of Pain: denies (1/9/2017  8:29 AM)  Pain Rating Prior to Med Admin: 0 (1/9/2017  8:29 AM)  Pain Rating Post Med Admin: 0 (1/9/2017  8:29 AM)      "

## 2017-01-10 LAB
ANION GAP SERPL CALC-SCNC: 9 MMOL/L
BASOPHILS # BLD AUTO: 0 K/UL
BASOPHILS NFR BLD: 0.6 %
BUN SERPL-MCNC: 10 MG/DL
CALCIUM SERPL-MCNC: 8.7 MG/DL
CHLORIDE SERPL-SCNC: 106 MMOL/L
CO2 SERPL-SCNC: 24 MMOL/L
CREAT SERPL-MCNC: 0.8 MG/DL
DIFFERENTIAL METHOD: ABNORMAL
EOSINOPHIL # BLD AUTO: 0.1 K/UL
EOSINOPHIL NFR BLD: 2.7 %
ERYTHROCYTE [DISTWIDTH] IN BLOOD BY AUTOMATED COUNT: 16.2 %
EST. GFR  (AFRICAN AMERICAN): >60 ML/MIN/1.73 M^2
EST. GFR  (NON AFRICAN AMERICAN): >60 ML/MIN/1.73 M^2
GLUCOSE SERPL-MCNC: 119 MG/DL
HCT VFR BLD AUTO: 29.7 %
HGB BLD-MCNC: 10.3 G/DL
LYMPHOCYTES # BLD AUTO: 0.4 K/UL
LYMPHOCYTES NFR BLD: 8.7 %
MCH RBC QN AUTO: 30.5 PG
MCHC RBC AUTO-ENTMCNC: 34.8 %
MCV RBC AUTO: 88 FL
MONOCYTES # BLD AUTO: 0.4 K/UL
MONOCYTES NFR BLD: 8.8 %
NEUTROPHILS # BLD AUTO: 3.9 K/UL
NEUTROPHILS NFR BLD: 79.2 %
PLATELET # BLD AUTO: 145 K/UL
PMV BLD AUTO: 7.3 FL
POTASSIUM SERPL-SCNC: 3.9 MMOL/L
RBC # BLD AUTO: 3.38 M/UL
SODIUM SERPL-SCNC: 139 MMOL/L
WBC # BLD AUTO: 4.9 K/UL

## 2017-01-10 PROCEDURE — 25000003 PHARM REV CODE 250: Performed by: SURGERY

## 2017-01-10 PROCEDURE — 36415 COLL VENOUS BLD VENIPUNCTURE: CPT

## 2017-01-10 PROCEDURE — 94799 UNLISTED PULMONARY SVC/PX: CPT

## 2017-01-10 PROCEDURE — 80048 BASIC METABOLIC PNL TOTAL CA: CPT

## 2017-01-10 PROCEDURE — 85025 COMPLETE CBC W/AUTO DIFF WBC: CPT

## 2017-01-10 PROCEDURE — 12000002 HC ACUTE/MED SURGE SEMI-PRIVATE ROOM

## 2017-01-10 PROCEDURE — 63600175 PHARM REV CODE 636 W HCPCS: Performed by: SURGERY

## 2017-01-10 PROCEDURE — 94761 N-INVAS EAR/PLS OXIMETRY MLT: CPT

## 2017-01-10 RX ADMIN — HYDROMORPHONE HYDROCHLORIDE 1 MG: 2 INJECTION, SOLUTION INTRAMUSCULAR; INTRAVENOUS; SUBCUTANEOUS at 12:01

## 2017-01-10 RX ADMIN — ZOLPIDEM TARTRATE 5 MG: 5 TABLET, FILM COATED ORAL at 08:01

## 2017-01-10 RX ADMIN — DEXTROSE, SODIUM CHLORIDE, SODIUM LACTATE, POTASSIUM CHLORIDE, AND CALCIUM CHLORIDE: 5; .6; .31; .03; .02 INJECTION, SOLUTION INTRAVENOUS at 08:01

## 2017-01-10 RX ADMIN — DIAZEPAM 10 MG: 5 TABLET ORAL at 08:01

## 2017-01-10 RX ADMIN — HYDROCODONE BITARTRATE AND ACETAMINOPHEN 1 TABLET: 5; 325 TABLET ORAL at 11:01

## 2017-01-10 RX ADMIN — HYDROCODONE BITARTRATE AND ACETAMINOPHEN 1 TABLET: 5; 325 TABLET ORAL at 06:01

## 2017-01-10 RX ADMIN — DEXTROSE, SODIUM CHLORIDE, SODIUM LACTATE, POTASSIUM CHLORIDE, AND CALCIUM CHLORIDE: 5; .6; .31; .03; .02 INJECTION, SOLUTION INTRAVENOUS at 12:01

## 2017-01-10 RX ADMIN — OXYBUTYNIN CHLORIDE 5 MG: 5 TABLET ORAL at 08:01

## 2017-01-10 RX ADMIN — BICALUTAMIDE 50 MG: 50 TABLET, FILM COATED ORAL at 09:01

## 2017-01-10 RX ADMIN — ENOXAPARIN SODIUM 40 MG: 100 INJECTION SUBCUTANEOUS at 11:01

## 2017-01-10 RX ADMIN — DEXTROSE, SODIUM CHLORIDE, SODIUM LACTATE, POTASSIUM CHLORIDE, AND CALCIUM CHLORIDE: 5; .6; .31; .03; .02 INJECTION, SOLUTION INTRAVENOUS at 07:01

## 2017-01-10 RX ADMIN — HYDROCODONE BITARTRATE AND ACETAMINOPHEN 1 TABLET: 5; 325 TABLET ORAL at 10:01

## 2017-01-10 RX ADMIN — METRONIDAZOLE 500 MG: 500 INJECTION, SOLUTION INTRAVENOUS at 04:01

## 2017-01-10 RX ADMIN — HYDROCODONE BITARTRATE AND ACETAMINOPHEN 1 TABLET: 5; 325 TABLET ORAL at 04:01

## 2017-01-10 RX ADMIN — HYDROMORPHONE HYDROCHLORIDE 1 MG: 2 INJECTION, SOLUTION INTRAMUSCULAR; INTRAVENOUS; SUBCUTANEOUS at 01:01

## 2017-01-10 NOTE — PROGRESS NOTES
POD 1 s/p ileostomy reversal.  Pt resting comfortabl.  Minimal abd pain. No n/v.  Nof ever/chills    NO flatus or BM    Wt Readings from Last 3 Encounters:   01/09/17 88.9 kg (196 lb)   01/05/17 90.7 kg (199 lb 15.3 oz)   01/05/17 90.7 kg (200 lb)     Temp Readings from Last 3 Encounters:   01/10/17 98.1 °F (36.7 °C) (Oral)   01/05/17 98.8 °F (37.1 °C) (Oral)   12/16/16 97.8 °F (36.6 °C) (Oral)     BP Readings from Last 3 Encounters:   01/10/17 (!) 142/65   01/05/17 (!) 105/58   01/05/17 (!) 107/51     Pulse Readings from Last 3 Encounters:   01/10/17 73   01/05/17 79   01/05/17 74     AAOx3  CTA B  Sinus  Soft/nd/nt +BS  2+ pulses B    Lab Results   Component Value Date    WBC 4.90 01/10/2017    HGB 10.3 (L) 01/10/2017    HCT 29.7 (L) 01/10/2017    MCV 88 01/10/2017     (L) 01/10/2017     BMP  Lab Results   Component Value Date     01/10/2017    K 3.9 01/10/2017     01/10/2017    CO2 24 01/10/2017    BUN 10 01/10/2017    CREATININE 0.8 01/10/2017    CALCIUM 8.7 01/10/2017    ANIONGAP 9 01/10/2017    ESTGFRAFRICA >60 01/10/2017    EGFRNONAA >60 01/10/2017     A/P: POD 1 s/p ileostomy reversal.   Ambulate   Await bowel function  Possible d/c today

## 2017-01-10 NOTE — PROGRESS NOTES
01/10/17 1447   Oxygen Therapy   SpO2 98 %   Pulse Oximetry Type Intermittent   Oxygen Concentration (%) 21   O2 Device (Oxygen Therapy) room air   IS at bedside. Encouraged use.

## 2017-01-10 NOTE — PLAN OF CARE
Problem: Patient Care Overview  Goal: Plan of Care Review  Outcome: Ongoing (interventions implemented as appropriate)  Patient pain well controlled on current regimen. x1 dose IV dilaudid administered today for breakthrough pain uncontrolled w/ PO Norco. Patient encouraged to spend time OOB. Originally, goal was OOB for lunch/sitting up in chair. Patient unable to tolerate sitting up in chair, but patient took 40 steps in room w/ RN assistance, wife with standby assist. Wife of pt attentive to care. Patient anticipating discharge in AM after d/w Dr Pierre.     No significant events this shift.

## 2017-01-10 NOTE — PROGRESS NOTES
8:30 AM  Dr Pierre at patient's bedside now- performing drsg change at this time, packing removal and replacement at this time. Dressed w/ bordered gauze.     Dr Pierre discussed POC w/ patient and wife. Both report comfortable discharging in the morning. Dr Pierre states patient not required to have BM prior to DC, anticipating BM in 2-4 days more until BM occurs.

## 2017-01-10 NOTE — OP NOTE
DATE OF PROCEDURE:  01/09/2017    STAFF SURGEON:  Hany Pierre M.D.    PREOPERATIVE DIAGNOSIS:  Ileostomy.    POSTOPERATIVE DIAGNOSIS:  Ileostomy.    PROCEDURE:  Ileostomy reversal.    ASSISTANT:  ANANT Becerril.    ANESTHESIA:  General endotracheal anesthesia.    INDICATION FOR PROCEDURE:  A pleasant 71-year-old gentleman with history of   rectal cancer.  He is six weeks status post LAR with diverting loop ileostomy.    He was scheduled for ileostomy reversal on above-mentioned date.    DESCRIPTION OF PROCEDURE:  Following signing of informed consent, he received   preoperative IV antibiotics, taken to the OR and placed in supine position.    SCDs were placed.  General anesthesia was administered without event and he was   prepped and draped in standard fashion.  Following prepping and draping of the   patient, appropriate timeout procedure was then performed.  Having made   appropriate timeout procedure, I used Bovie cautery to separate the   mucocutaneous junction without event.  Having done this circumferentially, I   dissected the plane adjacent to the bowel  from the subcutaneous fat   all the way down to the fascia.  I then entered the abdominal cavity for   adhesions circumferentially such that the bowel could make it into the cavity   easily.  I then resected the mesentery at the apices of the loop.  Ligation was   performed between Sylwia clamps.  I then placed a CAPO-75 device to each lumen and   created and stapled CAPO-75 device anastomosis without event.  I then stapled   the common enterotomy.  There was a patent anastomosis.  This was then reduced   into the abdominal cavity.  I then closed the fascia with a running PDS suture.    I then stapled the skin shut.  The patient was awakened and taken to Recovery   Room in stable condition.  There were no immediate complications.          /trever [id number] zackary(s)        TIARA  dd: 01/09/2017 14:05:22 (CST)  td: 01/09/2017 15:41:02 (CST)  Doc ID    #[id number]  Job ID #[id number]    CC:

## 2017-01-10 NOTE — PLAN OF CARE
Problem: Patient Care Overview  Goal: Plan of Care Review  Outcome: Ongoing (interventions implemented as appropriate)  Pt had an un eventful night free from fall or injury.  Pt c/o mild to moderate pain relieved by current pain regimen.  Abd surgical dx remains intact with sm amount of controlled drainage that was marked by nurse.  Pt's suprapubic catheter remained patent draining clear yellow urine.  Pt's VSS with NAD noted.  Pt is tolerating ice chips and sips of water at this time and will remain on a clear liquid diet.  Bedside rails are raised x3 with call bell and bedside table within reach.  Pt's wife remains at bedside to help assist pt.  Nurse rounded hourly to ensure pt safety.

## 2017-01-11 ENCOUNTER — TELEPHONE (OUTPATIENT)
Dept: SURGERY | Facility: CLINIC | Age: 72
End: 2017-01-11

## 2017-01-11 VITALS
OXYGEN SATURATION: 98 % | BODY MASS INDEX: 28.06 KG/M2 | DIASTOLIC BLOOD PRESSURE: 74 MMHG | HEIGHT: 70 IN | TEMPERATURE: 98 F | HEART RATE: 73 BPM | SYSTOLIC BLOOD PRESSURE: 158 MMHG | WEIGHT: 196 LBS | RESPIRATION RATE: 20 BRPM

## 2017-01-11 LAB
ANION GAP SERPL CALC-SCNC: 9 MMOL/L
BASOPHILS # BLD AUTO: 0 K/UL
BASOPHILS NFR BLD: 0.2 %
BUN SERPL-MCNC: 8 MG/DL
CALCIUM SERPL-MCNC: 9 MG/DL
CHLORIDE SERPL-SCNC: 106 MMOL/L
CO2 SERPL-SCNC: 24 MMOL/L
CREAT SERPL-MCNC: 0.7 MG/DL
DIFFERENTIAL METHOD: ABNORMAL
EOSINOPHIL # BLD AUTO: 0.2 K/UL
EOSINOPHIL NFR BLD: 5.1 %
ERYTHROCYTE [DISTWIDTH] IN BLOOD BY AUTOMATED COUNT: 16.1 %
EST. GFR  (AFRICAN AMERICAN): >60 ML/MIN/1.73 M^2
EST. GFR  (NON AFRICAN AMERICAN): >60 ML/MIN/1.73 M^2
GLUCOSE SERPL-MCNC: 117 MG/DL
HCT VFR BLD AUTO: 27.5 %
HGB BLD-MCNC: 9.5 G/DL
LYMPHOCYTES # BLD AUTO: 0.5 K/UL
LYMPHOCYTES NFR BLD: 12.2 %
MCH RBC QN AUTO: 30.4 PG
MCHC RBC AUTO-ENTMCNC: 34.5 %
MCV RBC AUTO: 88 FL
MONOCYTES # BLD AUTO: 0.4 K/UL
MONOCYTES NFR BLD: 9.6 %
NEUTROPHILS # BLD AUTO: 3 K/UL
NEUTROPHILS NFR BLD: 72.9 %
PLATELET # BLD AUTO: 146 K/UL
PMV BLD AUTO: 7.5 FL
POTASSIUM SERPL-SCNC: 3.4 MMOL/L
RBC # BLD AUTO: 3.12 M/UL
SODIUM SERPL-SCNC: 139 MMOL/L
WBC # BLD AUTO: 4.1 K/UL

## 2017-01-11 PROCEDURE — 80048 BASIC METABOLIC PNL TOTAL CA: CPT

## 2017-01-11 PROCEDURE — 36415 COLL VENOUS BLD VENIPUNCTURE: CPT

## 2017-01-11 PROCEDURE — 85025 COMPLETE CBC W/AUTO DIFF WBC: CPT

## 2017-01-11 PROCEDURE — 25000003 PHARM REV CODE 250: Performed by: SURGERY

## 2017-01-11 RX ORDER — HYDROCODONE BITARTRATE AND ACETAMINOPHEN 5; 325 MG/1; MG/1
1 TABLET ORAL EVERY 4 HOURS PRN
Qty: 30 TABLET | Refills: 0 | Status: ON HOLD | OUTPATIENT
Start: 2017-01-11 | End: 2017-10-01 | Stop reason: HOSPADM

## 2017-01-11 RX ADMIN — DIAZEPAM 10 MG: 5 TABLET ORAL at 08:01

## 2017-01-11 RX ADMIN — HYDROCODONE BITARTRATE AND ACETAMINOPHEN 1 TABLET: 5; 325 TABLET ORAL at 08:01

## 2017-01-11 RX ADMIN — OXYBUTYNIN CHLORIDE 5 MG: 5 TABLET ORAL at 08:01

## 2017-01-11 NOTE — TELEPHONE ENCOUNTER
----- Message from Kin Hernandez sent at 1/11/2017  1:36 PM CST -----  Contact: [facility] to schedule a 2 week post op for patient (around 1/23/17).  Patient surgery was done on 1/9/17.    Patient call back number is [phone]  I spoke to patient's wife and scheduled his post op appointment on Wed., 1/25/17 at 2:45pm, but told to come in at 1pm.  Tam

## 2017-01-11 NOTE — PROGRESS NOTES
10:51 AM  Called Dr Pierre, voice msg left, no answer to cell. Called to inform him that patient and spouse inquiring about plan for discharge today. Awaiting call back at this time.     11:06 AM  Recv'd call back from Dr Pierre- states patient OK to DC home today. Patient to DC with [place] 5-325 mg, to be sent electronically to patient's pharmacy. Patient to f/u in 2 weeks at Dr Pierre's office for staple removal.     Regarding wound care: if patient's wife comfortable w/ changing packing- will instruct her to do so daily. Otherwise, bordered gauze dressing required to incision opening, moderate amount of drainage expected.     Furthermore, patient to report to call Dr Pierre or report to ER for questions/concerns or infection-like symptoms.     Dr Pierre states OK for patient not to receive Lovenox @ 1200 prior to discharge.    Awaiting discharge orders at this time. Will notify pt and wife of the above now.

## 2017-01-11 NOTE — PROGRESS NOTES
1300  Discharge instructions, Rx, activity restrictions, wound care instructions reviewed w/ both patient and spouse. Dressing to abdominal/surgical incision changed. Education given to patient and wife re wound care. Lopez bag changed to Leg bag per patient's wife request. PIV removed from R wrist. Patient tolerated well/catheter intact. Emergency-like symptoms and s/s of infection reviewed w/ patient and spouse.     Both verbalize understanding of POC.

## 2017-01-11 NOTE — PROGRESS NOTES
01/11/17 1145   Incentive Spirometer   $ Administration (Incentive Spirometer) done independently per patient  (reminded patient to use until back on feet)

## 2017-01-11 NOTE — PROGRESS NOTES
01/11/17 1338   Final Note   Assessment Type Final Discharge Note   Discharge Disposition Home   Discharge planning education complete? Yes

## 2017-01-11 NOTE — DISCHARGE SUMMARY
[facility]  Discharge Summary      Admit Date: 1/9/2017    Discharge Date and Time: 1/11/2017 11:10 AM    Attending Physician: Hany Pierre MD     Reason for Admission: s/p ileostomy reversal    Procedures Performed: Procedure(s) (LRB):  CLOSURE-ILEOSTOMY (N/A)    Hospital Course (synopsis of major diagnoses, care, treatment, and services provided during the course of the hospital stay): Pt presented for ileostomy reverrsal.  Pt procedure uneventful.  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       Discharged Condition: good    Disposition: Home or Self Care    Follow Up/Patient Instructions:     Medications:  Reconciled Home Medications:   Current Discharge Medication List      START taking these medications    Details   !! hydrocodone-acetaminophen 5-325mg (NORCO) 5-325 mg per tablet Take 1 tablet by mouth every 4 (four) hours as needed.  Qty: 30 tablet, Refills: 0       !! - Potential duplicate medications found. Please discuss with provider.      CONTINUE these medications which have NOT CHANGED    Details   aspirin (ECOTRIN) 81 MG EC tablet Take 81 mg by mouth nightly.       bicalutamide (CASODEX) 50 MG Tab Take 50 mg by mouth once daily.      carbamazepine (TEGRETOL) 200 mg tablet Take 200 mg by mouth 3  (three) times daily as needed.       cyanocobalamin 500 MCG tablet Take 1,000 mcg by mouth once daily.       diazePAM (VALIUM) 10 MG Tab Take 1 tablet (10 mg total) by mouth every 12 (twelve) hours. Takes 5 mg am and 10 mg pm  Qty: 60 tablet, Refills: 1    Associated Diagnoses: Anxiety      fluoxetine (PROZAC) 20 MG capsule Take 20 mg by mouth every evening.   Refills: 4      folic acid (FOLVITE) 400 MCG tablet Take 400 mcg by mouth once daily.      gabapentin (NEURONTIN) 600 MG tablet Take 600 mg by mouth 3 (three) times daily. Use prior to surgery      !! hydrocodone-acetaminophen 5-325mg (NORCO) 5-325 mg per tablet Take 1 tablet by mouth every 4 (four) hours as needed.  Qty: 30 tablet, Refills: 0      omeprazole (PRILOSEC) 40 MG capsule Take 40 mg by mouth once daily. Use prior to surgery      oxybutynin (DITROPAN) 5 MG Tab Take 5 mg by mouth 2 (two) times daily. Use prior to surgery      !! pyridoxine (B-6) 25 MG Tab Take 25 mg by mouth once daily.      !! pyridoxine, vitamin B6, (VITAMIN B-6) 100 MG Tab Take 100 mg by mouth 2 (two) times daily.      simvastatin (ZOCOR) 20 MG tablet Take 20 mg by mouth every evening. Use another dose in AM for surgery      sulfamethoxazole-trimethoprim 400-80mg (BACTRIM,SEPTRA) 400-80 mg per tablet Take 1 tablet by mouth once daily.       tamsulosin (FLOMAX) 0.4 mg Cp24 Take 0.4 mg by mouth as needed.       temazepam (RESTORIL) 30 mg capsule Take 30 mg by mouth nightly as needed for Insomnia.      enzalutamide (XTANDI) 40 mg Cap Take 40 Doses by mouth once daily. 4 caps      leuprolide, 6 month, (ELIGARD) 45 mg (6 month) injection Inject 45 mg into the skin every 3 (three) months.      nitroGLYCERIN (NITROSTAT) 0.4 MG SL tablet Place 0.4 mg under the tongue every 5 (five) minutes as needed for Chest pain.       !! - Potential duplicate medications found. Please discuss with provider.      STOP taking these medications       metoprolol succinate (TOPROL-XL) 25 MG 24 hr tablet  Comments:   Reason for Stopping:               Discharge Procedure Orders  Diet general     Lifting restrictions     Call MD for:  temperature >100.4     Call MD for:  persistent nausea and vomiting or diarrhea     Call MD for:  increased confusion or weakness     Call MD for:  persistent dizziness, light-headedness, or visual disturbances     Call MD for:  worsening rash     Call MD for:  severe persistent headache     Call MD for:  difficulty breathing or increased cough     Call MD for:  redness, tenderness, or signs of infection (pain, swelling, redness, odor or green/yellow discharge around incision site)     Call MD for:  severe uncontrolled pain       Follow-up Information     Follow up with Hany Pierre MD In 2 weeks.    Specialties:  General Surgery, Colon and Rectal Surgery, Surgery    Contact information:    [address] [place] [address]  [phone]

## 2017-01-11 NOTE — PROGRESS NOTES
CM completed assessment with pt and spouse.  Pt arrived from home, he is self care with equipment needed.  Pt has HH with [facility].  Disposition:  Pt will discharge to home and Possibly resume [facility] .       01/11/17 1120   Discharge Assessment   Assessment Type Discharge Planning Assessment   Confirmed/corrected address and phone number on facesheet? Yes   Assessment information obtained from? Patient   Type of Healthcare Directive Received Durable power of  for health care  (wife Corinna)   Prior to hospitilization cognitive status: Alert/Oriented   Prior to hospitalization functional status: Independent;Assistive Equipment   Current cognitive status: Alert/Oriented   Current Functional Status: Independent   Arrived From admitted as an inpatient;home or self-care;home health   Lives With spouse   Able to Return to Prior Arrangements yes   Is patient able to care for self after discharge? Yes   How many people do you have in your home that can help with your care after discharge? 1   Who are your caregiver(s) and their phone number(s)? Corinna Quinones  [phone]   Patient's perception of discharge disposition home or selfcare;home health   Readmission Within The Last 30 Days no previous admission in last 30 days   Patient currently being followed by outpatient case management? No   Patient currently receives home health services? No;Yes   Patient previously received home health services and would like to resume services if necessary? Yes   If yes, name of home health provider: [facility]   Does the patient currently use HME? Yes   Patient currently receives private duty nursing? N/A   Patient currently receives any other outside agency services? No   Equipment Currently Used at Home cane, straight;shower chair;rollator   Do you have any problems affording any of your prescribed medications? No   Is the patient taking medications as prescribed? yes  ([facility])   Do you  have any financial concerns preventing you from receiving the healthcare you need? No  (Insurance verified as Medicare and bcbs)   Does the patient have transportation to healthcare appointments? Yes   Transportation Available family or friend will provide   On Dialysis? No   Does the patient receive services at the [facility]? No   Are there any open cases? No   Discharge Plan A Home with family;Home Health   Discharge Plan B Home with family;Home Health   Patient/Family In Agreement With Plan yes

## 2017-01-11 NOTE — PLAN OF CARE
Problem: Patient Care Overview  Goal: Plan of Care Review  Outcome: Ongoing (interventions implemented as appropriate)  Pt resting in bed, easily aroused. Wife at bedside. AAOx3. No c/o pain or distress. Dx: ileostomy reversal. POD#2. Plan of care reviewed. Questions answered. Verbalized understanding. IVFs infusing. Suprapubic catheter intact. Incision w/drsg to RUQ. Light/GI soft diet. No BM noted. SCDs. I.S. Amb TID. No further complaints. Call light in reach. Bed low. Will cont to monitor.

## 2017-01-13 ENCOUNTER — PATIENT OUTREACH (OUTPATIENT)
Dept: ADMINISTRATIVE | Facility: CLINIC | Age: 72
End: 2017-01-13
Payer: MEDICARE

## 2017-01-25 ENCOUNTER — OFFICE VISIT (OUTPATIENT)
Dept: SURGERY | Facility: CLINIC | Age: 72
End: 2017-01-25
Payer: MEDICARE

## 2017-01-25 VITALS
DIASTOLIC BLOOD PRESSURE: 86 MMHG | BODY MASS INDEX: 29.82 KG/M2 | HEIGHT: 70 IN | SYSTOLIC BLOOD PRESSURE: 194 MMHG | WEIGHT: 208.31 LBS | HEART RATE: 63 BPM | TEMPERATURE: 98 F

## 2017-01-25 DIAGNOSIS — Z09 POSTOP CHECK: Primary | ICD-10-CM

## 2017-01-25 DIAGNOSIS — Z85.038 HISTORY OF COLON CANCER: ICD-10-CM

## 2017-01-25 PROCEDURE — 99024 POSTOP FOLLOW-UP VISIT: CPT | Mod: ,,, | Performed by: SURGERY

## 2017-01-25 PROCEDURE — 99213 OFFICE O/P EST LOW 20 MIN: CPT | Mod: PBBFAC,PO | Performed by: SURGERY

## 2017-01-25 PROCEDURE — 99999 PR PBB SHADOW E&M-EST. PATIENT-LVL III: CPT | Mod: PBBFAC,,, | Performed by: SURGERY

## 2017-01-25 NOTE — PROGRESS NOTES
Cc: post op    HPI: 72 y.o.  male  2 weeks s/p ileostomy reversal.   Pt notes that he is feeling well.  No complaints.     PE: AFVSS    AAOx3  CTA  Soft/NT/nd  Inc: c/d/i        Path: small bowel    A/P:   Pt doing well post surgery.   F/U with me in 2.5 months  Check cea at that time  Needs cscope with Dr Delgado in Oct of 2017

## 2017-01-25 NOTE — MR AVS SNAPSHOT
[facility]  [address]  [place] [address]  Phone: [phone]                  Chester Quinones   2017 2:45 PM   Office Visit    Description:  Male : 1945   Provider:  Hany Pierre MD   Department:  [facility]           Reason for Visit     Post-op Evaluation           Diagnoses this Visit        Comments    Postop check    -  Primary            To Do List           Future Appointments        Provider Department Dept Phone    2017 2:45 PM Hany Pierre MD [facility] [phone]    2017 12:00 PM LAB, [facility] [phone]      Goals (5 Years of Data)     None      [facility]     [facility] On Call Nurse Care Line -  Assistance  Registered nurses in the [facility] On Call Center provide clinical advisement, health education, appointment booking, and other advisory services.  Call for this free service at [phone].             Medications                Verify that the below list of medications is an accurate representation of the medications you are currently taking.  If none reported, the list may be blank. If incorrect, please contact your healthcare provider. Carry this list with you in case of emergency.           Current Medications     aspirin (ECOTRIN) 81 MG EC tablet Take 81 mg by mouth nightly.     bicalutamide (CASODEX) 50 MG Tab Take 50 mg by mouth once daily.    carbamazepine (TEGRETOL) 200 mg tablet Take 200 mg by mouth 3 (three) times daily as needed.     cyanocobalamin 500 MCG tablet Take 1,000 mcg by mouth once daily.     diazePAM (VALIUM) 10 MG Tab Take 1 tablet (10 mg total) by mouth every 12 (twelve) hours. Takes 5 mg am and 10 mg pm    enzalutamide (XTANDI) 40 mg Cap Take 40 Doses by mouth once daily. 4 caps    fluoxetine (PROZAC) 20 MG capsule Take 20 mg by mouth every evening.     folic acid (FOLVITE) 400 MCG tablet Take 400 mcg by mouth once daily.     "gabapentin (NEURONTIN) 600 MG tablet Take 600 mg by mouth 3 (three) times daily. Use prior to surgery    leuprolide, 6 month, (ELIGARD) 45 mg (6 month) injection Inject 45 mg into the skin every 3 (three) months.    omeprazole (PRILOSEC) 40 MG capsule Take 40 mg by mouth once daily. Use prior to surgery    oxybutynin (DITROPAN) 5 MG Tab Take 5 mg by mouth 2 (two) times daily. Use prior to surgery    pyridoxine (B-6) 25 MG Tab Take 25 mg by mouth once daily.    pyridoxine, vitamin B6, (VITAMIN B-6) 100 MG Tab Take 100 mg by mouth 2 (two) times daily.    simvastatin (ZOCOR) 20 MG tablet Take 20 mg by mouth every evening. Use another dose in AM for surgery    sulfamethoxazole-trimethoprim 400-80mg (BACTRIM,SEPTRA) 400-80 mg per tablet Take 1 tablet by mouth once daily.     temazepam (RESTORIL) 30 mg capsule Take 30 mg by mouth nightly as needed for Insomnia.    hydrocodone-acetaminophen 5-325mg (NORCO) 5-325 mg per tablet Take 1 tablet by mouth every 4 (four) hours as needed.    hydrocodone-acetaminophen 5-325mg (NORCO) 5-325 mg per tablet Take 1 tablet by mouth every 4 (four) hours as needed.    nitroGLYCERIN (NITROSTAT) 0.4 MG SL tablet Place 0.4 mg under the tongue every 5 (five) minutes as needed for Chest pain.    tamsulosin (FLOMAX) 0.4 mg Cp24 Take 0.4 mg by mouth as needed.            Clinical Reference Information           Vital Signs - Last Recorded  Most recent update: 1/25/2017 12:49 PM by Tam Villatoro MA    BP Pulse Temp Ht Wt BMI    (!) 194/86 63 97.8 °F (36.6 °C) (Oral) 5' 10" (1.778 m) 94.5 kg (208 lb 5.4 oz) 29.89 kg/m2      Blood Pressure          Most Recent Value    BP  (!)  194/86      Allergies as of 1/25/2017     Niacin      Immunizations Administered on Date of Encounter - 1/25/2017     None      "

## 2017-02-16 ENCOUNTER — LAB VISIT (OUTPATIENT)
Dept: LAB | Facility: HOSPITAL | Age: 72
End: 2017-02-16
Attending: INTERNAL MEDICINE
Payer: MEDICARE

## 2017-02-16 DIAGNOSIS — C20 MALIGNANT TUMOR OF RECTUM: ICD-10-CM

## 2017-02-16 DIAGNOSIS — C61 PROSTATE CANCER: ICD-10-CM

## 2017-02-16 LAB
ALBUMIN SERPL BCP-MCNC: 4.1 G/DL
ALP SERPL-CCNC: 69 U/L
ALT SERPL W/O P-5'-P-CCNC: 30 U/L
ANION GAP SERPL CALC-SCNC: 11 MMOL/L
AST SERPL-CCNC: 23 U/L
BASOPHILS # BLD AUTO: 0.04 K/UL
BASOPHILS NFR BLD: 0.5 %
BILIRUB SERPL-MCNC: 0.8 MG/DL
BUN SERPL-MCNC: 19 MG/DL
CALCIUM SERPL-MCNC: 9.2 MG/DL
CEA SERPL-MCNC: 0.9 NG/ML
CHLORIDE SERPL-SCNC: 97 MMOL/L
CO2 SERPL-SCNC: 26 MMOL/L
COMPLEXED PSA SERPL-MCNC: 0.66 NG/ML
CREAT SERPL-MCNC: 0.9 MG/DL
DIFFERENTIAL METHOD: ABNORMAL
EOSINOPHIL # BLD AUTO: 0.2 K/UL
EOSINOPHIL NFR BLD: 2.7 %
ERYTHROCYTE [DISTWIDTH] IN BLOOD BY AUTOMATED COUNT: 14.4 %
EST. GFR  (AFRICAN AMERICAN): >60 ML/MIN/1.73 M^2
EST. GFR  (NON AFRICAN AMERICAN): >60 ML/MIN/1.73 M^2
GLUCOSE SERPL-MCNC: 115 MG/DL
HCT VFR BLD AUTO: 35.2 %
HGB BLD-MCNC: 12.3 G/DL
LYMPHOCYTES # BLD AUTO: 1.1 K/UL
LYMPHOCYTES NFR BLD: 12.7 %
MCH RBC QN AUTO: 31.5 PG
MCHC RBC AUTO-ENTMCNC: 34.9 %
MCV RBC AUTO: 90 FL
MONOCYTES # BLD AUTO: 0.8 K/UL
MONOCYTES NFR BLD: 9.6 %
NEUTROPHILS # BLD AUTO: 6.3 K/UL
NEUTROPHILS NFR BLD: 74.5 %
NRBC BLD-RTO: 0 /100 WBC
PLATELET # BLD AUTO: 248 K/UL
PMV BLD AUTO: 9.7 FL
POTASSIUM SERPL-SCNC: 3 MMOL/L
PROT SERPL-MCNC: 6.8 G/DL
RBC # BLD AUTO: 3.91 M/UL
SODIUM SERPL-SCNC: 134 MMOL/L
WBC # BLD AUTO: 8.41 K/UL

## 2017-02-16 PROCEDURE — 84153 ASSAY OF PSA TOTAL: CPT | Mod: PN

## 2017-02-16 PROCEDURE — 36415 COLL VENOUS BLD VENIPUNCTURE: CPT | Mod: PN

## 2017-02-16 PROCEDURE — 82378 CARCINOEMBRYONIC ANTIGEN: CPT | Mod: PN

## 2017-02-16 PROCEDURE — 80053 COMPREHEN METABOLIC PANEL: CPT | Mod: PN

## 2017-02-16 PROCEDURE — 85025 COMPLETE CBC W/AUTO DIFF WBC: CPT | Mod: PN

## 2017-02-16 PROCEDURE — 82378 CARCINOEMBRYONIC ANTIGEN: CPT

## 2017-02-16 PROCEDURE — 80053 COMPREHEN METABOLIC PANEL: CPT

## 2017-02-16 PROCEDURE — 84153 ASSAY OF PSA TOTAL: CPT

## 2017-02-16 PROCEDURE — 85025 COMPLETE CBC W/AUTO DIFF WBC: CPT

## 2017-03-17 ENCOUNTER — LAB VISIT (OUTPATIENT)
Dept: LAB | Facility: HOSPITAL | Age: 72
End: 2017-03-17
Attending: INTERNAL MEDICINE
Payer: MEDICARE

## 2017-03-17 DIAGNOSIS — C61 PROSTATE CA: ICD-10-CM

## 2017-03-17 DIAGNOSIS — C20 MALIGNANT TUMOR OF RECTUM: ICD-10-CM

## 2017-03-17 DIAGNOSIS — C20 RECTAL CANCER: ICD-10-CM

## 2017-03-17 LAB
ALBUMIN SERPL BCP-MCNC: 3.9 G/DL
ALP SERPL-CCNC: 81 U/L
ALT SERPL W/O P-5'-P-CCNC: 24 U/L
ANION GAP SERPL CALC-SCNC: 11 MMOL/L
AST SERPL-CCNC: 20 U/L
BASOPHILS # BLD AUTO: 0.04 K/UL
BASOPHILS NFR BLD: 0.8 %
BILIRUB SERPL-MCNC: 0.7 MG/DL
BUN SERPL-MCNC: 15 MG/DL
CALCIUM SERPL-MCNC: 9.1 MG/DL
CHLORIDE SERPL-SCNC: 98 MMOL/L
CO2 SERPL-SCNC: 29 MMOL/L
COMPLEXED PSA SERPL-MCNC: 0.66 NG/ML
CREAT SERPL-MCNC: 0.84 MG/DL
DIFFERENTIAL METHOD: ABNORMAL
EOSINOPHIL # BLD AUTO: 0.2 K/UL
EOSINOPHIL NFR BLD: 3.4 %
ERYTHROCYTE [DISTWIDTH] IN BLOOD BY AUTOMATED COUNT: 13.4 %
EST. GFR  (AFRICAN AMERICAN): >60 ML/MIN/1.73 M^2
EST. GFR  (NON AFRICAN AMERICAN): >60 ML/MIN/1.73 M^2
GLUCOSE SERPL-MCNC: 107 MG/DL
HCT VFR BLD AUTO: 34 %
HGB BLD-MCNC: 11.6 G/DL
LYMPHOCYTES # BLD AUTO: 0.9 K/UL
LYMPHOCYTES NFR BLD: 17.7 %
MCH RBC QN AUTO: 31.5 PG
MCHC RBC AUTO-ENTMCNC: 34.1 %
MCV RBC AUTO: 92 FL
MONOCYTES # BLD AUTO: 0.5 K/UL
MONOCYTES NFR BLD: 9.9 %
NEUTROPHILS # BLD AUTO: 3.6 K/UL
NEUTROPHILS NFR BLD: 68.2 %
NRBC BLD-RTO: 0 /100 WBC
PLATELET # BLD AUTO: 225 K/UL
PMV BLD AUTO: 9.3 FL
POTASSIUM SERPL-SCNC: 3.8 MMOL/L
PROT SERPL-MCNC: 6.8 G/DL
RBC # BLD AUTO: 3.68 M/UL
SODIUM SERPL-SCNC: 138 MMOL/L
WBC # BLD AUTO: 5.24 K/UL

## 2017-03-17 PROCEDURE — 84153 ASSAY OF PSA TOTAL: CPT | Mod: PN

## 2017-03-17 PROCEDURE — 85025 COMPLETE CBC W/AUTO DIFF WBC: CPT

## 2017-03-17 PROCEDURE — 85025 COMPLETE CBC W/AUTO DIFF WBC: CPT | Mod: PN

## 2017-03-17 PROCEDURE — 84153 ASSAY OF PSA TOTAL: CPT

## 2017-03-17 PROCEDURE — 36415 COLL VENOUS BLD VENIPUNCTURE: CPT | Mod: PN

## 2017-03-17 PROCEDURE — 80053 COMPREHEN METABOLIC PANEL: CPT | Mod: PN

## 2017-03-17 PROCEDURE — 80053 COMPREHEN METABOLIC PANEL: CPT

## 2017-04-10 PROBLEM — Z09 POSTOP CHECK: Status: RESOLVED | Noted: 2017-01-09 | Resolved: 2017-04-10

## 2017-04-20 ENCOUNTER — LAB VISIT (OUTPATIENT)
Dept: LAB | Facility: HOSPITAL | Age: 72
End: 2017-04-20
Attending: SURGERY
Payer: MEDICARE

## 2017-04-20 ENCOUNTER — OFFICE VISIT (OUTPATIENT)
Dept: SURGERY | Facility: CLINIC | Age: 72
End: 2017-04-20
Payer: MEDICARE

## 2017-04-20 VITALS
BODY MASS INDEX: 28.72 KG/M2 | HEART RATE: 55 BPM | DIASTOLIC BLOOD PRESSURE: 66 MMHG | TEMPERATURE: 97 F | HEIGHT: 70 IN | SYSTOLIC BLOOD PRESSURE: 165 MMHG | WEIGHT: 200.63 LBS

## 2017-04-20 DIAGNOSIS — Z85.048 HISTORY OF RECTAL CANCER: Primary | ICD-10-CM

## 2017-04-20 DIAGNOSIS — Z85.038 HISTORY OF COLON CANCER: ICD-10-CM

## 2017-04-20 LAB — CEA SERPL-MCNC: 1.3 NG/ML

## 2017-04-20 PROCEDURE — 99212 OFFICE O/P EST SF 10 MIN: CPT | Mod: S$PBB,,, | Performed by: SURGERY

## 2017-04-20 PROCEDURE — 99213 OFFICE O/P EST LOW 20 MIN: CPT | Mod: PBBFAC,PO | Performed by: SURGERY

## 2017-04-20 PROCEDURE — 99999 PR PBB SHADOW E&M-EST. PATIENT-LVL III: CPT | Mod: PBBFAC,,, | Performed by: SURGERY

## 2017-04-20 RX ORDER — TEMAZEPAM 15 MG/1
15 CAPSULE ORAL NIGHTLY PRN
Refills: 1 | COMMUNITY
Start: 2017-04-17 | End: 2018-03-05 | Stop reason: SDUPTHER

## 2017-04-20 RX ORDER — LOSARTAN POTASSIUM AND HYDROCHLOROTHIAZIDE 12.5; 1 MG/1; MG/1
1 TABLET ORAL DAILY
Refills: 1 | Status: ON HOLD | COMMUNITY
Start: 2017-03-06 | End: 2017-10-01 | Stop reason: HOSPADM

## 2017-04-20 RX ORDER — METOPROLOL TARTRATE 25 MG/1
1 TABLET, FILM COATED ORAL DAILY
Refills: 0 | Status: ON HOLD | COMMUNITY
Start: 2017-02-06 | End: 2017-10-01 | Stop reason: HOSPADM

## 2017-04-20 RX ORDER — AMLODIPINE BESYLATE 5 MG/1
5 TABLET ORAL DAILY
Refills: 0 | COMMUNITY
Start: 2017-04-10 | End: 2017-11-20

## 2017-04-20 NOTE — MR AVS SNAPSHOT
[facility]  [address]  [facility] [address]  Phone: [phone]                  Chester Quinonse   2017 3:15 PM   Office Visit    Description:  Male : 1945   Provider:  Hany Pierre MD   Department:  [facility]           Reason for Visit     Follow-up           Diagnoses this Visit        Comments    History of rectal cancer    -  Primary            To Do List           Future Appointments        Provider Department Dept Phone    2017 8:45 AM LAB, [facility] [phone]    2017 9:00 AM LAB, [facility] [phone]    2017 10:00 AM LAB, [place] [facility] [phone]    2017 1:00 PM Hany Pierre MD [facility] [phone]      Goals (5 Years of Data)     None      [facility] On Call     [facility] Nurse Care Line -  Assistance  Unless otherwise directed by your provider, please contact [facility], our nurse care line that is available for  assistance.     Registered nurses in the [facility] On [facility] Center provide: appointment scheduling, clinical advisement, health education, and other advisory services.  Call: [phone] (toll free)               Medications                Verify that the below list of medications is an accurate representation of the medications you are currently taking.  If none reported, the list may be blank. If incorrect, please contact your healthcare provider. Carry this list with you in case of emergency.           Current Medications     amlodipine (NORVASC) 5 MG tablet Take 5 mg by mouth once daily.    bicalutamide (CASODEX) 50 MG Tab Take 50 mg by mouth once daily.    carbamazepine (TEGRETOL) 200 mg tablet Take 200 mg by mouth 3 (three) times daily as needed.     cyanocobalamin 500 MCG tablet Take 1,000 mcg by mouth once daily.     diazePAM (VALIUM) 10 MG Tab Take 1 tablet  (10 mg total) by mouth every 12 (twelve) hours. Takes 5 mg am and 10 mg pm    enzalutamide (XTANDI) 40 mg Cap Take 160 mg by mouth once daily. Take 4 capsules daily    fluoxetine (PROZAC) 20 MG capsule TAKE ONE CAPSULE BY MOUTH DAILY    folic acid (FOLVITE) 400 MCG tablet Take 400 mcg by mouth once daily.    gabapentin (NEURONTIN) 600 MG tablet Take 600 mg by mouth 3 (three) times daily. Use prior to surgery    leuprolide, 6 month, (ELIGARD) 45 mg (6 month) injection Inject 45 mg into the skin every 3 (three) months.    losartan-hydrochlorothiazide 100-12.5 mg (HYZAAR) 100-12.5 mg Tab Take 1 tablet by mouth once daily.    metoprolol tartrate (LOPRESSOR) 25 MG tablet Take 1 tablet by mouth once daily.    omeprazole (PRILOSEC) 40 MG capsule Take 40 mg by mouth once daily. Use prior to surgery    oxybutynin (DITROPAN) 5 MG Tab Take 5 mg by mouth 2 (two) times daily. Use prior to surgery    pyridoxine (B-6) 25 MG Tab Take 25 mg by mouth once daily.    pyridoxine, vitamin B6, (VITAMIN B-6) 100 MG Tab Take 100 mg by mouth 2 (two) times daily.    simvastatin (ZOCOR) 20 MG tablet Take 20 mg by mouth every evening. Use another dose in AM for surgery    sulfamethoxazole-trimethoprim 400-80mg (BACTRIM,SEPTRA) 400-80 mg per tablet Take 1 tablet by mouth once daily.     tamsulosin (FLOMAX) 0.4 mg Cp24 Take 0.4 mg by mouth as needed.     temazepam (RESTORIL) 15 mg Cap Take 15 mg by mouth nightly.    aspirin (ECOTRIN) 81 MG EC tablet Take 81 mg by mouth nightly.     hydrocodone-acetaminophen 5-325mg (NORCO) 5-325 mg per tablet Take 1 tablet by mouth every 4 (four) hours as needed.    hydrocodone-acetaminophen 5-325mg (NORCO) 5-325 mg per tablet Take 1 tablet by mouth every 4 (four) hours as needed.    nitroGLYCERIN (NITROSTAT) 0.4 MG SL tablet Place 0.4 mg under the tongue every 5 (five) minutes as needed for Chest pain.    ondansetron (ZOFRAN-ODT) 8 MG TbDL Take 1 tablet (8 mg total) by mouth every 12 (twelve) hours as needed.     "       Clinical Reference Information           Your Vitals Were     BP Pulse Temp Height Weight BMI    165/66 55 97.4 °F (36.3 °C) (Oral) 5' 10" (1.778 m) 91 kg (200 lb 9.9 oz) 28.79 kg/m2      Blood Pressure          Most Recent Value    BP  (!)  165/66      Allergies as of 4/20/2017     Niacin      Immunizations Administered on Date of Encounter - 4/20/2017     None      Orders Placed During Today's Visit     Future Labs/Procedures Expected by Expires    CEA  7/20/2017 6/19/2018      Language Assistance Services     ATTENTION: Language assistance services are available, free of charge. Please call [phone].      ATENCIÓN: Si habla español, tiene a [place] disposición servicios gratuitos de asistencia lingüística. Llame al [phone].     CHÚ Ý: N?u b?n nói Ti?ng Vi?t, có các d?ch v? h? tr? ngôn ng? mi?n phí dành cho b?n. G?i s? [phone].         [facility] complies with applicable Federal civil rights laws and does not discriminate on the basis of race, color, national origin, age, disability, or sex.        "

## 2017-04-20 NOTE — PROGRESS NOTES
PLeasant 73 yo M whom I am familiar with.  He is s/p LAR for a St I distal rectal cancer in 11/16.  Pt had reversal of ileostomy in 1/17.  Pt notes that generally he has been doing well. He does note some issues with fecal urgency and having loose stools.  Denies n/v.  No significant abd pain. Is having some occasional pelvic pain.  Also notes blood per suprapubic cath on occasion    AFVSS  AAOx3  CTA B  Sinus  Soft/nt/nd   REctal: Ext exam with no evidence of disease.  Anastomosis patent at about 6 cm.      Lab Results   Component Value Date    CEA 0.9 02/16/2017     A/P: History of rectal cancer  Check cea today  rtc in 3 months    Most recent ct scan demonstrates an area of concernin the L lung. Recommend biopsy of this

## 2017-04-24 ENCOUNTER — LAB VISIT (OUTPATIENT)
Dept: LAB | Facility: HOSPITAL | Age: 72
End: 2017-04-24
Attending: INTERNAL MEDICINE
Payer: MEDICARE

## 2017-04-24 DIAGNOSIS — C61 PROSTATE CA: ICD-10-CM

## 2017-04-24 DIAGNOSIS — C20 RECTAL CANCER: ICD-10-CM

## 2017-04-24 LAB
ALBUMIN SERPL BCP-MCNC: 4.1 G/DL
ALP SERPL-CCNC: 70 U/L
ALT SERPL W/O P-5'-P-CCNC: 24 U/L
ANION GAP SERPL CALC-SCNC: 9 MMOL/L
AST SERPL-CCNC: 15 U/L
BASOPHILS # BLD AUTO: 0.03 K/UL
BASOPHILS NFR BLD: 0.6 %
BILIRUB SERPL-MCNC: 0.7 MG/DL
BUN SERPL-MCNC: 22 MG/DL
CALCIUM SERPL-MCNC: 9.4 MG/DL
CHLORIDE SERPL-SCNC: 100 MMOL/L
CO2 SERPL-SCNC: 28 MMOL/L
COMPLEXED PSA SERPL-MCNC: 0.68 NG/ML
CREAT SERPL-MCNC: 1.1 MG/DL
DIFFERENTIAL METHOD: ABNORMAL
EOSINOPHIL # BLD AUTO: 0.2 K/UL
EOSINOPHIL NFR BLD: 3.3 %
ERYTHROCYTE [DISTWIDTH] IN BLOOD BY AUTOMATED COUNT: 13.7 %
EST. GFR  (AFRICAN AMERICAN): >60 ML/MIN/1.73 M^2
EST. GFR  (NON AFRICAN AMERICAN): >60 ML/MIN/1.73 M^2
FERRITIN SERPL-MCNC: 44 NG/ML
GLUCOSE SERPL-MCNC: 104 MG/DL
HCT VFR BLD AUTO: 32.1 %
HGB BLD-MCNC: 11.1 G/DL
IRON SATN MFR SERPL: 26 %
IRON SERPL-MCNC: 80 UG/DL
LYMPHOCYTES # BLD AUTO: 0.8 K/UL
LYMPHOCYTES NFR BLD: 15.4 %
MCH RBC QN AUTO: 31.3 PG
MCHC RBC AUTO-ENTMCNC: 34.6 %
MCV RBC AUTO: 90 FL
MONOCYTES # BLD AUTO: 0.5 K/UL
MONOCYTES NFR BLD: 9.4 %
NEUTROPHILS # BLD AUTO: 3.5 K/UL
NEUTROPHILS NFR BLD: 71.3 %
NRBC BLD-RTO: 0 /100 WBC
PLATELET # BLD AUTO: 186 K/UL
PMV BLD AUTO: 9.4 FL
POTASSIUM SERPL-SCNC: 4.2 MMOL/L
PROT SERPL-MCNC: 6.7 G/DL
RBC # BLD AUTO: 3.55 M/UL
SODIUM SERPL-SCNC: 137 MMOL/L
TOTAL IRON BINDING CAPACITY: 303 UG/DL
WBC # BLD AUTO: 4.87 K/UL

## 2017-04-24 PROCEDURE — 80053 COMPREHEN METABOLIC PANEL: CPT | Mod: PN

## 2017-04-24 PROCEDURE — 84153 ASSAY OF PSA TOTAL: CPT

## 2017-04-24 PROCEDURE — 84153 ASSAY OF PSA TOTAL: CPT | Mod: PN

## 2017-04-24 PROCEDURE — 36415 COLL VENOUS BLD VENIPUNCTURE: CPT | Mod: PN

## 2017-04-24 PROCEDURE — 83540 ASSAY OF IRON: CPT

## 2017-04-24 PROCEDURE — 82728 ASSAY OF FERRITIN: CPT

## 2017-04-24 PROCEDURE — 80053 COMPREHEN METABOLIC PANEL: CPT

## 2017-04-24 PROCEDURE — 85025 COMPLETE CBC W/AUTO DIFF WBC: CPT

## 2017-04-24 PROCEDURE — 85025 COMPLETE CBC W/AUTO DIFF WBC: CPT | Mod: PN

## 2017-04-24 PROCEDURE — 82728 ASSAY OF FERRITIN: CPT | Mod: PN

## 2017-04-24 PROCEDURE — 83540 ASSAY OF IRON: CPT | Mod: PN

## 2017-05-22 ENCOUNTER — LAB VISIT (OUTPATIENT)
Dept: LAB | Facility: HOSPITAL | Age: 72
End: 2017-05-22
Attending: INTERNAL MEDICINE
Payer: MEDICARE

## 2017-05-22 DIAGNOSIS — C20 RECTAL CANCER: ICD-10-CM

## 2017-05-22 DIAGNOSIS — C61 PROSTATE CA: ICD-10-CM

## 2017-05-22 LAB
ALBUMIN SERPL BCP-MCNC: 4 G/DL
ALP SERPL-CCNC: 66 U/L
ALT SERPL W/O P-5'-P-CCNC: 28 U/L
ANION GAP SERPL CALC-SCNC: 8 MMOL/L
AST SERPL-CCNC: 13 U/L
BASOPHILS # BLD AUTO: 0.03 K/UL
BASOPHILS NFR BLD: 0.7 %
BILIRUB SERPL-MCNC: 0.8 MG/DL
BUN SERPL-MCNC: 20 MG/DL
CALCIUM SERPL-MCNC: 9.3 MG/DL
CHLORIDE SERPL-SCNC: 104 MMOL/L
CO2 SERPL-SCNC: 28 MMOL/L
COMPLEXED PSA SERPL-MCNC: 0.71 NG/ML
CREAT SERPL-MCNC: 1.33 MG/DL
DIFFERENTIAL METHOD: ABNORMAL
EOSINOPHIL # BLD AUTO: 0.2 K/UL
EOSINOPHIL NFR BLD: 4.8 %
ERYTHROCYTE [DISTWIDTH] IN BLOOD BY AUTOMATED COUNT: 14.3 %
EST. GFR  (AFRICAN AMERICAN): >60 ML/MIN/1.73 M^2
EST. GFR  (NON AFRICAN AMERICAN): 52 ML/MIN/1.73 M^2
GLUCOSE SERPL-MCNC: 118 MG/DL
HCT VFR BLD AUTO: 31.5 %
HGB BLD-MCNC: 10.6 G/DL
LYMPHOCYTES # BLD AUTO: 0.7 K/UL
LYMPHOCYTES NFR BLD: 15.2 %
MCH RBC QN AUTO: 31.2 PG
MCHC RBC AUTO-ENTMCNC: 33.7 %
MCV RBC AUTO: 93 FL
MONOCYTES # BLD AUTO: 0.3 K/UL
MONOCYTES NFR BLD: 7.1 %
NEUTROPHILS # BLD AUTO: 3.1 K/UL
NEUTROPHILS NFR BLD: 72.2 %
NRBC BLD-RTO: 0 /100 WBC
PLATELET # BLD AUTO: 197 K/UL
PMV BLD AUTO: 9.9 FL
POTASSIUM SERPL-SCNC: 4.2 MMOL/L
PROT SERPL-MCNC: 6.6 G/DL
RBC # BLD AUTO: 3.4 M/UL
SODIUM SERPL-SCNC: 140 MMOL/L
WBC # BLD AUTO: 4.35 K/UL

## 2017-05-22 PROCEDURE — 36415 COLL VENOUS BLD VENIPUNCTURE: CPT | Mod: PN

## 2017-05-22 PROCEDURE — 85025 COMPLETE CBC W/AUTO DIFF WBC: CPT

## 2017-05-22 PROCEDURE — 84153 ASSAY OF PSA TOTAL: CPT

## 2017-05-22 PROCEDURE — 80053 COMPREHEN METABOLIC PANEL: CPT

## 2017-05-22 PROCEDURE — 80053 COMPREHEN METABOLIC PANEL: CPT | Mod: PN

## 2017-05-22 PROCEDURE — 84153 ASSAY OF PSA TOTAL: CPT | Mod: PN

## 2017-05-22 PROCEDURE — 85025 COMPLETE CBC W/AUTO DIFF WBC: CPT | Mod: PN

## 2017-06-20 ENCOUNTER — LAB VISIT (OUTPATIENT)
Dept: LAB | Facility: HOSPITAL | Age: 72
End: 2017-06-20
Attending: INTERNAL MEDICINE
Payer: MEDICARE

## 2017-06-20 DIAGNOSIS — C20 MALIGNANT TUMOR OF RECTUM: ICD-10-CM

## 2017-06-20 DIAGNOSIS — C61 PROSTATE CA: ICD-10-CM

## 2017-06-20 DIAGNOSIS — C20 RECTAL CANCER: ICD-10-CM

## 2017-06-20 LAB
ALBUMIN SERPL BCP-MCNC: 4.1 G/DL
ALP SERPL-CCNC: 72 U/L
ALT SERPL W/O P-5'-P-CCNC: 7 U/L
ANION GAP SERPL CALC-SCNC: 13 MMOL/L
AST SERPL-CCNC: 14 U/L
BASOPHILS # BLD AUTO: 0.02 K/UL
BASOPHILS NFR BLD: 0.4 %
BILIRUB SERPL-MCNC: 0.7 MG/DL
BUN SERPL-MCNC: 14 MG/DL
CALCIUM SERPL-MCNC: 9.2 MG/DL
CHLORIDE SERPL-SCNC: 102 MMOL/L
CO2 SERPL-SCNC: 25 MMOL/L
COMPLEXED PSA SERPL-MCNC: 0.83 NG/ML
CREAT SERPL-MCNC: 1.13 MG/DL
DIFFERENTIAL METHOD: ABNORMAL
EOSINOPHIL # BLD AUTO: 0.2 K/UL
EOSINOPHIL NFR BLD: 3.8 %
ERYTHROCYTE [DISTWIDTH] IN BLOOD BY AUTOMATED COUNT: 13.7 %
EST. GFR  (AFRICAN AMERICAN): >60 ML/MIN/1.73 M^2
EST. GFR  (NON AFRICAN AMERICAN): >60 ML/MIN/1.73 M^2
GLUCOSE SERPL-MCNC: 91 MG/DL
HCT VFR BLD AUTO: 31.3 %
HGB BLD-MCNC: 10.8 G/DL
LYMPHOCYTES # BLD AUTO: 0.8 K/UL
LYMPHOCYTES NFR BLD: 16.9 %
MCH RBC QN AUTO: 31 PG
MCHC RBC AUTO-ENTMCNC: 34.5 %
MCV RBC AUTO: 90 FL
MONOCYTES # BLD AUTO: 0.6 K/UL
MONOCYTES NFR BLD: 11.1 %
NEUTROPHILS # BLD AUTO: 3.4 K/UL
NEUTROPHILS NFR BLD: 67.8 %
NRBC BLD-RTO: 0 /100 WBC
PLATELET # BLD AUTO: 217 K/UL
PMV BLD AUTO: 9.8 FL
POTASSIUM SERPL-SCNC: 3.1 MMOL/L
PROT SERPL-MCNC: 6.7 G/DL
RBC # BLD AUTO: 3.48 M/UL
SODIUM SERPL-SCNC: 140 MMOL/L
WBC # BLD AUTO: 4.96 K/UL

## 2017-06-20 PROCEDURE — 84153 ASSAY OF PSA TOTAL: CPT

## 2017-06-20 PROCEDURE — 80053 COMPREHEN METABOLIC PANEL: CPT | Mod: PN

## 2017-06-20 PROCEDURE — 85025 COMPLETE CBC W/AUTO DIFF WBC: CPT | Mod: PN

## 2017-06-20 PROCEDURE — 36415 COLL VENOUS BLD VENIPUNCTURE: CPT | Mod: PN

## 2017-06-20 PROCEDURE — 85025 COMPLETE CBC W/AUTO DIFF WBC: CPT

## 2017-06-20 PROCEDURE — 84153 ASSAY OF PSA TOTAL: CPT | Mod: PN

## 2017-06-20 PROCEDURE — 80053 COMPREHEN METABOLIC PANEL: CPT

## 2017-07-20 ENCOUNTER — LAB VISIT (OUTPATIENT)
Dept: LAB | Facility: HOSPITAL | Age: 72
End: 2017-07-20
Attending: SURGERY
Payer: MEDICARE

## 2017-07-20 ENCOUNTER — OFFICE VISIT (OUTPATIENT)
Dept: SURGERY | Facility: CLINIC | Age: 72
End: 2017-07-20
Payer: MEDICARE

## 2017-07-20 VITALS
SYSTOLIC BLOOD PRESSURE: 138 MMHG | BODY MASS INDEX: 28.5 KG/M2 | WEIGHT: 199.06 LBS | TEMPERATURE: 98 F | HEART RATE: 57 BPM | DIASTOLIC BLOOD PRESSURE: 63 MMHG | HEIGHT: 70 IN

## 2017-07-20 DIAGNOSIS — Z85.048 HISTORY OF RECTAL CANCER: Primary | ICD-10-CM

## 2017-07-20 DIAGNOSIS — Z85.048 HISTORY OF RECTAL CANCER: ICD-10-CM

## 2017-07-20 LAB — CEA SERPL-MCNC: 1.4 NG/ML

## 2017-07-20 PROCEDURE — 99213 OFFICE O/P EST LOW 20 MIN: CPT | Mod: PBBFAC,PO | Performed by: SURGERY

## 2017-07-20 PROCEDURE — 99999 PR PBB SHADOW E&M-EST. PATIENT-LVL III: CPT | Mod: PBBFAC,,, | Performed by: SURGERY

## 2017-07-20 PROCEDURE — 99211 OFF/OP EST MAY X REQ PHY/QHP: CPT | Mod: S$PBB,,, | Performed by: SURGERY

## 2017-07-20 NOTE — PROGRESS NOTES
Pt with whom I am familiar.  He is s/p LAR with diverting ileostomy in 11/16 for a T2NO rectal cancer.  Pt notes that he is feeling well. NOtes clustering of BM.  No pain.   Cont to be treated for metastatic prostate cancer.       AFVSS  AAox3  CTA B  Sinus  Soft/nt/nd    CEA: Pending    A?P: History of rectal cancer  RTC in 3 months with CEA  Repeat cscope in Nov with DR Delgado

## 2017-08-21 ENCOUNTER — LAB VISIT (OUTPATIENT)
Dept: LAB | Facility: HOSPITAL | Age: 72
End: 2017-08-21
Attending: INTERNAL MEDICINE
Payer: MEDICARE

## 2017-08-21 DIAGNOSIS — C61 PROSTATE CA: ICD-10-CM

## 2017-08-21 DIAGNOSIS — C20 MALIGNANT TUMOR OF RECTUM: ICD-10-CM

## 2017-08-21 LAB
ALBUMIN SERPL BCP-MCNC: 4 G/DL
ALP SERPL-CCNC: 79 U/L
ALT SERPL W/O P-5'-P-CCNC: 19 U/L
ANION GAP SERPL CALC-SCNC: 11 MMOL/L
AST SERPL-CCNC: 13 U/L
BASOPHILS # BLD AUTO: 0.03 K/UL
BASOPHILS NFR BLD: 0.7 %
BILIRUB SERPL-MCNC: 0.7 MG/DL
BUN SERPL-MCNC: 18 MG/DL
CALCIUM SERPL-MCNC: 9.4 MG/DL
CHLORIDE SERPL-SCNC: 104 MMOL/L
CO2 SERPL-SCNC: 25 MMOL/L
COMPLEXED PSA SERPL-MCNC: 0.98 NG/ML
CREAT SERPL-MCNC: 1.21 MG/DL
DIFFERENTIAL METHOD: ABNORMAL
EOSINOPHIL # BLD AUTO: 0.2 K/UL
EOSINOPHIL NFR BLD: 4.9 %
ERYTHROCYTE [DISTWIDTH] IN BLOOD BY AUTOMATED COUNT: 13.8 %
EST. GFR  (AFRICAN AMERICAN): >60 ML/MIN/1.73 M^2
EST. GFR  (NON AFRICAN AMERICAN): 59 ML/MIN/1.73 M^2
GLUCOSE SERPL-MCNC: 112 MG/DL
HCT VFR BLD AUTO: 31.4 %
HGB BLD-MCNC: 10.6 G/DL
LYMPHOCYTES # BLD AUTO: 0.7 K/UL
LYMPHOCYTES NFR BLD: 15.8 %
MCH RBC QN AUTO: 30.5 PG
MCHC RBC AUTO-ENTMCNC: 33.8 G/DL
MCV RBC AUTO: 90 FL
MONOCYTES # BLD AUTO: 0.2 K/UL
MONOCYTES NFR BLD: 5.6 %
NEUTROPHILS # BLD AUTO: 3.1 K/UL
NEUTROPHILS NFR BLD: 73 %
NRBC BLD-RTO: 0 /100 WBC
PLATELET # BLD AUTO: 176 K/UL
PMV BLD AUTO: 9.9 FL
POTASSIUM SERPL-SCNC: 3.4 MMOL/L
PROT SERPL-MCNC: 6.8 G/DL
RBC # BLD AUTO: 3.48 M/UL
SODIUM SERPL-SCNC: 140 MMOL/L
WBC # BLD AUTO: 4.3 K/UL

## 2017-08-21 PROCEDURE — 85025 COMPLETE CBC W/AUTO DIFF WBC: CPT

## 2017-08-21 PROCEDURE — 84153 ASSAY OF PSA TOTAL: CPT

## 2017-08-21 PROCEDURE — 85025 COMPLETE CBC W/AUTO DIFF WBC: CPT | Mod: PN

## 2017-08-21 PROCEDURE — 36415 COLL VENOUS BLD VENIPUNCTURE: CPT | Mod: PN

## 2017-08-21 PROCEDURE — 80053 COMPREHEN METABOLIC PANEL: CPT | Mod: PN

## 2017-08-21 PROCEDURE — 84153 ASSAY OF PSA TOTAL: CPT | Mod: PN

## 2017-08-21 PROCEDURE — 80053 COMPREHEN METABOLIC PANEL: CPT

## 2017-09-27 PROBLEM — T83.510A URINARY TRACT INFECTION ASSOCIATED WITH CYSTOSTOMY CATHETER: Status: ACTIVE | Noted: 2017-09-27

## 2017-09-27 PROBLEM — N13.1 HYDRONEPHROSIS WITH URETERAL STRICTURE: Status: ACTIVE | Noted: 2017-09-27

## 2017-09-27 PROBLEM — N39.0 URINARY TRACT INFECTION ASSOCIATED WITH CYSTOSTOMY CATHETER: Status: ACTIVE | Noted: 2017-09-27

## 2017-09-28 PROBLEM — N13.1 HYDRONEPHROSIS WITH URETERAL STRICTURE: Status: RESOLVED | Noted: 2017-09-27 | Resolved: 2017-09-28

## 2017-09-28 PROBLEM — D64.9 ANEMIA: Status: ACTIVE | Noted: 2017-09-28

## 2017-09-29 PROBLEM — N17.9 AKI (ACUTE KIDNEY INJURY): Status: ACTIVE | Noted: 2017-09-29

## 2017-09-30 PROBLEM — I71.40 ABDOMINAL AORTIC ANEURYSM (AAA) WITHOUT RUPTURE: Status: ACTIVE | Noted: 2017-09-30

## 2017-09-30 PROBLEM — I72.3 ILIAC ARTERY ANEURYSM, BILATERAL: Status: ACTIVE | Noted: 2017-09-30

## 2017-10-05 ENCOUNTER — LAB VISIT (OUTPATIENT)
Dept: LAB | Facility: HOSPITAL | Age: 72
End: 2017-10-05
Attending: INTERNAL MEDICINE
Payer: MEDICARE

## 2017-10-05 DIAGNOSIS — C20 MALIGNANT TUMOR OF RECTUM: ICD-10-CM

## 2017-10-05 DIAGNOSIS — C61 PROSTATE CA: ICD-10-CM

## 2017-10-05 LAB
ALBUMIN SERPL BCP-MCNC: 3.6 G/DL
ALP SERPL-CCNC: 103 U/L
ALT SERPL W/O P-5'-P-CCNC: 33 U/L
ANION GAP SERPL CALC-SCNC: 12 MMOL/L
AST SERPL-CCNC: 14 U/L
BASOPHILS # BLD AUTO: 0.04 K/UL
BASOPHILS NFR BLD: 0.7 %
BILIRUB SERPL-MCNC: 0.4 MG/DL
BUN SERPL-MCNC: 15 MG/DL
CALCIUM SERPL-MCNC: 9.4 MG/DL
CHLORIDE SERPL-SCNC: 103 MMOL/L
CO2 SERPL-SCNC: 24 MMOL/L
COMPLEXED PSA SERPL-MCNC: 1.3 NG/ML
CREAT SERPL-MCNC: 1.22 MG/DL
DIFFERENTIAL METHOD: ABNORMAL
EOSINOPHIL # BLD AUTO: 0.2 K/UL
EOSINOPHIL NFR BLD: 3.6 %
ERYTHROCYTE [DISTWIDTH] IN BLOOD BY AUTOMATED COUNT: 13.9 %
EST. GFR  (AFRICAN AMERICAN): >60 ML/MIN/1.73 M^2
EST. GFR  (NON AFRICAN AMERICAN): 58 ML/MIN/1.73 M^2
GLUCOSE SERPL-MCNC: 105 MG/DL
HCT VFR BLD AUTO: 35.2 %
HGB BLD-MCNC: 11.3 G/DL
LYMPHOCYTES # BLD AUTO: 0.9 K/UL
LYMPHOCYTES NFR BLD: 14.3 %
MCH RBC QN AUTO: 28.8 PG
MCHC RBC AUTO-ENTMCNC: 32.1 G/DL
MCV RBC AUTO: 90 FL
MONOCYTES # BLD AUTO: 0.4 K/UL
MONOCYTES NFR BLD: 6.8 %
NEUTROPHILS # BLD AUTO: 4.5 K/UL
NEUTROPHILS NFR BLD: 74.6 %
NRBC BLD-RTO: 0 /100 WBC
PLATELET # BLD AUTO: 336 K/UL
PLATELET BLD QL SMEAR: ABNORMAL
PMV BLD AUTO: 8.9 FL
POTASSIUM SERPL-SCNC: 4 MMOL/L
PROT SERPL-MCNC: 6.8 G/DL
RBC # BLD AUTO: 3.93 M/UL
SODIUM SERPL-SCNC: 139 MMOL/L
WBC # BLD AUTO: 6.03 K/UL

## 2017-10-05 PROCEDURE — 85025 COMPLETE CBC W/AUTO DIFF WBC: CPT

## 2017-10-05 PROCEDURE — 80053 COMPREHEN METABOLIC PANEL: CPT

## 2017-10-05 PROCEDURE — 80053 COMPREHEN METABOLIC PANEL: CPT | Mod: PN

## 2017-10-05 PROCEDURE — 84153 ASSAY OF PSA TOTAL: CPT

## 2017-10-05 PROCEDURE — 84153 ASSAY OF PSA TOTAL: CPT | Mod: PN

## 2017-10-05 PROCEDURE — 36415 COLL VENOUS BLD VENIPUNCTURE: CPT | Mod: PN

## 2017-10-05 PROCEDURE — 85025 COMPLETE CBC W/AUTO DIFF WBC: CPT | Mod: PN

## 2017-10-20 ENCOUNTER — LAB VISIT (OUTPATIENT)
Dept: LAB | Facility: HOSPITAL | Age: 72
End: 2017-10-20
Attending: INTERNAL MEDICINE
Payer: MEDICARE

## 2017-10-20 ENCOUNTER — OFFICE VISIT (OUTPATIENT)
Dept: SURGERY | Facility: CLINIC | Age: 72
End: 2017-10-20
Payer: MEDICARE

## 2017-10-20 VITALS
TEMPERATURE: 97 F | WEIGHT: 184.75 LBS | DIASTOLIC BLOOD PRESSURE: 60 MMHG | HEART RATE: 80 BPM | SYSTOLIC BLOOD PRESSURE: 130 MMHG | BODY MASS INDEX: 26.45 KG/M2 | HEIGHT: 70 IN

## 2017-10-20 DIAGNOSIS — Z85.048 HISTORY OF RECTAL CANCER: Primary | ICD-10-CM

## 2017-10-20 DIAGNOSIS — Z85.048 HISTORY OF RECTAL CANCER: ICD-10-CM

## 2017-10-20 DIAGNOSIS — C20 MALIGNANT TUMOR OF RECTUM: ICD-10-CM

## 2017-10-20 DIAGNOSIS — C20 RECTAL CANCER: ICD-10-CM

## 2017-10-20 DIAGNOSIS — D63.8 ANEMIA OF CHRONIC DISEASE: ICD-10-CM

## 2017-10-20 DIAGNOSIS — C61 PROSTATE CA: ICD-10-CM

## 2017-10-20 LAB
BASOPHILS # BLD AUTO: 0.03 K/UL
BASOPHILS NFR BLD: 0.5 %
CEA SERPL-MCNC: 1.6 NG/ML
DIFFERENTIAL METHOD: ABNORMAL
EOSINOPHIL # BLD AUTO: 0.3 K/UL
EOSINOPHIL NFR BLD: 3.9 %
ERYTHROCYTE [DISTWIDTH] IN BLOOD BY AUTOMATED COUNT: 14.3 %
HCT VFR BLD AUTO: 33.5 %
HGB BLD-MCNC: 10.8 G/DL
IMM GRANULOCYTES # BLD AUTO: 0.02 K/UL
IMM GRANULOCYTES NFR BLD AUTO: 0.3 %
LYMPHOCYTES # BLD AUTO: 0.8 K/UL
LYMPHOCYTES NFR BLD: 11.9 %
MCH RBC QN AUTO: 28 PG
MCHC RBC AUTO-ENTMCNC: 32.2 G/DL
MCV RBC AUTO: 87 FL
MONOCYTES # BLD AUTO: 0.6 K/UL
MONOCYTES NFR BLD: 9 %
NEUTROPHILS # BLD AUTO: 4.8 K/UL
NEUTROPHILS NFR BLD: 74.4 %
NRBC BLD-RTO: 0 /100 WBC
PLATELET # BLD AUTO: 205 K/UL
PMV BLD AUTO: 10.1 FL
RBC # BLD AUTO: 3.86 M/UL
WBC # BLD AUTO: 6.45 K/UL

## 2017-10-20 PROCEDURE — 82378 CARCINOEMBRYONIC ANTIGEN: CPT

## 2017-10-20 PROCEDURE — 99213 OFFICE O/P EST LOW 20 MIN: CPT | Mod: PBBFAC,PO | Performed by: SURGERY

## 2017-10-20 PROCEDURE — 85025 COMPLETE CBC W/AUTO DIFF WBC: CPT

## 2017-10-20 PROCEDURE — 99212 OFFICE O/P EST SF 10 MIN: CPT | Mod: S$PBB,,, | Performed by: SURGERY

## 2017-10-20 PROCEDURE — 99999 PR PBB SHADOW E&M-EST. PATIENT-LVL III: CPT | Mod: PBBFAC,,, | Performed by: SURGERY

## 2017-10-20 PROCEDURE — 36415 COLL VENOUS BLD VENIPUNCTURE: CPT | Mod: PO

## 2017-10-20 NOTE — PROGRESS NOTES
Omari 71 yo M returning for 3 month evaluation.  Pt with known history of metastatic prostate cancer who had LAR in 11/2016 for a T2N0 rectal cancer. Pt over past 3 months has had issues with bilateral hydroneprhosis and had bilateral nephrostomy tubes at present.  From a GI perspective reports regular BM .  No pain and no bleeding    AFVSS  AAox3  CTA B  Sinus  Soft/nt/nd  2+ pulses B    Lab Results   Component Value Date    CEA 1.4 07/20/2017       A?P: s/p LAR for rectal cancer  Check cea today  Pt needs cscope with Dr Delgado   F/u with me in 3 months

## 2017-11-02 ENCOUNTER — LAB VISIT (OUTPATIENT)
Dept: LAB | Facility: HOSPITAL | Age: 72
End: 2017-11-02
Attending: INTERNAL MEDICINE
Payer: MEDICARE

## 2017-11-02 DIAGNOSIS — C61 PROSTATE CA: ICD-10-CM

## 2017-11-02 LAB
ALBUMIN SERPL BCP-MCNC: 3.9 G/DL
ALP SERPL-CCNC: 98 U/L
ALT SERPL W/O P-5'-P-CCNC: 21 U/L
ANION GAP SERPL CALC-SCNC: 13 MMOL/L
AST SERPL-CCNC: 11 U/L
BASOPHILS # BLD AUTO: 0.04 K/UL
BASOPHILS NFR BLD: 0.5 %
BILIRUB SERPL-MCNC: 0.6 MG/DL
BUN SERPL-MCNC: 11 MG/DL
CALCIUM SERPL-MCNC: 9.5 MG/DL
CHLORIDE SERPL-SCNC: 99 MMOL/L
CO2 SERPL-SCNC: 23 MMOL/L
COMPLEXED PSA SERPL-MCNC: 1.4 NG/ML
CREAT SERPL-MCNC: 1.23 MG/DL
DIFFERENTIAL METHOD: ABNORMAL
EOSINOPHIL # BLD AUTO: 0.1 K/UL
EOSINOPHIL NFR BLD: 1.5 %
ERYTHROCYTE [DISTWIDTH] IN BLOOD BY AUTOMATED COUNT: 14.2 %
EST. GFR  (AFRICAN AMERICAN): >60 ML/MIN/1.73 M^2
EST. GFR  (NON AFRICAN AMERICAN): 57 ML/MIN/1.73 M^2
GLUCOSE SERPL-MCNC: 108 MG/DL
HCT VFR BLD AUTO: 32.7 %
HGB BLD-MCNC: 10.6 G/DL
LYMPHOCYTES # BLD AUTO: 0.8 K/UL
LYMPHOCYTES NFR BLD: 10.9 %
MCH RBC QN AUTO: 27.7 PG
MCHC RBC AUTO-ENTMCNC: 32.4 G/DL
MCV RBC AUTO: 86 FL
MONOCYTES # BLD AUTO: 0.7 K/UL
MONOCYTES NFR BLD: 9.3 %
NEUTROPHILS # BLD AUTO: 5.9 K/UL
NEUTROPHILS NFR BLD: 77.8 %
NRBC BLD-RTO: 0 /100 WBC
PLATELET # BLD AUTO: 279 K/UL
PMV BLD AUTO: 9.1 FL
POTASSIUM SERPL-SCNC: 4 MMOL/L
PROT SERPL-MCNC: 7.4 G/DL
RBC # BLD AUTO: 3.82 M/UL
SODIUM SERPL-SCNC: 135 MMOL/L
WBC # BLD AUTO: 7.53 K/UL

## 2017-11-02 PROCEDURE — 80053 COMPREHEN METABOLIC PANEL: CPT | Mod: PN

## 2017-11-02 PROCEDURE — 85025 COMPLETE CBC W/AUTO DIFF WBC: CPT | Mod: PN

## 2017-11-02 PROCEDURE — 36415 COLL VENOUS BLD VENIPUNCTURE: CPT | Mod: PN

## 2017-11-02 PROCEDURE — 85025 COMPLETE CBC W/AUTO DIFF WBC: CPT

## 2017-11-02 PROCEDURE — 80053 COMPREHEN METABOLIC PANEL: CPT

## 2017-11-02 PROCEDURE — 84153 ASSAY OF PSA TOTAL: CPT

## 2017-11-02 PROCEDURE — 84153 ASSAY OF PSA TOTAL: CPT | Mod: PN

## 2017-12-01 ENCOUNTER — LAB VISIT (OUTPATIENT)
Dept: LAB | Facility: HOSPITAL | Age: 72
End: 2017-12-01
Attending: PHYSICIAN ASSISTANT
Payer: MEDICARE

## 2017-12-01 DIAGNOSIS — C61 PROSTATE CA: ICD-10-CM

## 2017-12-01 PROBLEM — E78.2 MIXED HYPERLIPIDEMIA: Status: ACTIVE | Noted: 2017-12-01

## 2017-12-01 PROBLEM — I10 ESSENTIAL HYPERTENSION: Status: ACTIVE | Noted: 2017-12-01

## 2017-12-01 PROBLEM — G45.9 TRANSIENT CEREBRAL ISCHEMIA: Status: ACTIVE | Noted: 2017-12-01

## 2017-12-01 LAB
ALBUMIN SERPL BCP-MCNC: 3.8 G/DL
ALP SERPL-CCNC: 95 U/L
ALT SERPL W/O P-5'-P-CCNC: 20 U/L
ANION GAP SERPL CALC-SCNC: 11 MMOL/L
AST SERPL-CCNC: 12 U/L
BASOPHILS # BLD AUTO: 0.03 K/UL
BASOPHILS NFR BLD: 0.5 %
BILIRUB SERPL-MCNC: 0.6 MG/DL
BUN SERPL-MCNC: 13 MG/DL
CALCIUM SERPL-MCNC: 9.7 MG/DL
CHLORIDE SERPL-SCNC: 99 MMOL/L
CO2 SERPL-SCNC: 24 MMOL/L
COMPLEXED PSA SERPL-MCNC: 1.5 NG/ML
CREAT SERPL-MCNC: 0.96 MG/DL
DIFFERENTIAL METHOD: ABNORMAL
EOSINOPHIL # BLD AUTO: 0.2 K/UL
EOSINOPHIL NFR BLD: 3.4 %
ERYTHROCYTE [DISTWIDTH] IN BLOOD BY AUTOMATED COUNT: 16.1 %
EST. GFR  (AFRICAN AMERICAN): >60 ML/MIN/1.73 M^2
EST. GFR  (NON AFRICAN AMERICAN): >60 ML/MIN/1.73 M^2
GLUCOSE SERPL-MCNC: 96 MG/DL
HCT VFR BLD AUTO: 30.4 %
HGB BLD-MCNC: 9.8 G/DL
LYMPHOCYTES # BLD AUTO: 0.9 K/UL
LYMPHOCYTES NFR BLD: 13.9 %
MCH RBC QN AUTO: 27.8 PG
MCHC RBC AUTO-ENTMCNC: 32.2 G/DL
MCV RBC AUTO: 86 FL
MONOCYTES # BLD AUTO: 0.5 K/UL
MONOCYTES NFR BLD: 8.4 %
NEUTROPHILS # BLD AUTO: 4.6 K/UL
NEUTROPHILS NFR BLD: 73.8 %
NRBC BLD-RTO: 0 /100 WBC
PLATELET # BLD AUTO: 270 K/UL
PMV BLD AUTO: 8.9 FL
POTASSIUM SERPL-SCNC: 3.9 MMOL/L
PROT SERPL-MCNC: 7.1 G/DL
RBC # BLD AUTO: 3.52 M/UL
SODIUM SERPL-SCNC: 134 MMOL/L
WBC # BLD AUTO: 6.2 K/UL

## 2017-12-01 PROCEDURE — 85025 COMPLETE CBC W/AUTO DIFF WBC: CPT

## 2017-12-01 PROCEDURE — 84153 ASSAY OF PSA TOTAL: CPT

## 2017-12-01 PROCEDURE — 36415 COLL VENOUS BLD VENIPUNCTURE: CPT | Mod: PN

## 2017-12-01 PROCEDURE — 84153 ASSAY OF PSA TOTAL: CPT | Mod: PN

## 2017-12-01 PROCEDURE — 85025 COMPLETE CBC W/AUTO DIFF WBC: CPT | Mod: PN

## 2017-12-01 PROCEDURE — 80053 COMPREHEN METABOLIC PANEL: CPT

## 2017-12-01 PROCEDURE — 80053 COMPREHEN METABOLIC PANEL: CPT | Mod: PN

## 2017-12-02 PROBLEM — I63.432 CEREBROVASCULAR ACCIDENT (CVA) DUE TO EMBOLISM OF LEFT POSTERIOR CEREBRAL ARTERY: Status: ACTIVE | Noted: 2017-12-01

## 2017-12-02 PROBLEM — I74.9 TIA DUE TO EMBOLISM: Status: ACTIVE | Noted: 2017-12-01

## 2017-12-02 PROBLEM — I63.9 CVA (CEREBRAL VASCULAR ACCIDENT): Status: ACTIVE | Noted: 2017-12-02

## 2017-12-05 ENCOUNTER — TELEPHONE (OUTPATIENT)
Dept: NEUROLOGY | Facility: CLINIC | Age: 72
End: 2017-12-05

## 2017-12-05 NOTE — TELEPHONE ENCOUNTER
----- Message from Kin POWELL David sent at 12/5/2017  8:14 AM CST -----  Contact: [place]  Stephanie called in regarding the attached patient.  Stephanie stated patient is being discharged today 12/5 and stated both Dr. Patel & Dr. Bernardo saw patient while in the hospital.  Patient was seen for TIA's so not sure if 2 week follow up needs to be with Dr. Bernardo or Dr. Patel.      Please call patient directly at [phone] to schedule 2 week follow up.

## 2017-12-11 NOTE — TELEPHONE ENCOUNTER
Returned call and spoke with patient's wife, Anna. Appointment scheduled for 12/22. Anna verbalized understanding.

## 2017-12-22 ENCOUNTER — OFFICE VISIT (OUTPATIENT)
Dept: NEUROLOGY | Facility: CLINIC | Age: 72
End: 2017-12-22
Payer: MEDICARE

## 2017-12-22 VITALS
SYSTOLIC BLOOD PRESSURE: 122 MMHG | WEIGHT: 185 LBS | BODY MASS INDEX: 26.48 KG/M2 | DIASTOLIC BLOOD PRESSURE: 60 MMHG | HEIGHT: 70 IN | HEART RATE: 75 BPM | RESPIRATION RATE: 18 BRPM

## 2017-12-22 DIAGNOSIS — I74.9 TIA DUE TO EMBOLISM: Primary | ICD-10-CM

## 2017-12-22 DIAGNOSIS — G45.9 TIA DUE TO EMBOLISM: Primary | ICD-10-CM

## 2017-12-22 DIAGNOSIS — E78.2 MIXED HYPERLIPIDEMIA: ICD-10-CM

## 2017-12-22 DIAGNOSIS — N35.914 ANTERIOR URETHRAL STRICTURE: ICD-10-CM

## 2017-12-22 DIAGNOSIS — I10 ESSENTIAL HYPERTENSION: ICD-10-CM

## 2017-12-22 PROBLEM — Z86.73 HISTORY OF STROKE: Status: ACTIVE | Noted: 2017-12-02

## 2017-12-22 PROCEDURE — 99213 OFFICE O/P EST LOW 20 MIN: CPT | Mod: PBBFAC,PN | Performed by: PSYCHIATRY & NEUROLOGY

## 2017-12-22 PROCEDURE — 99214 OFFICE O/P EST MOD 30 MIN: CPT | Mod: S$PBB,,, | Performed by: PSYCHIATRY & NEUROLOGY

## 2017-12-22 PROCEDURE — 99999 PR PBB SHADOW E&M-EST. PATIENT-LVL III: CPT | Mod: PBBFAC,,, | Performed by: PSYCHIATRY & NEUROLOGY

## 2017-12-22 NOTE — PATIENT INSTRUCTIONS
PLAN:  -- hold aspirin 7 days prior to tube change, resume the following day if not bleeding from tubes, if bleeding, resume once bleeding has stopped  -- otherwise continue Aspirin 325mg daily  -- continue simvastatin 40mg daily

## 2017-12-22 NOTE — PROGRESS NOTES
Date of service: 12/22/2017  Referring provider: No ref. provider found    Subjective:      Chief complaint: Stroke       Patient ID: Chester Quinones is a 72 y.o. 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     History of Present Illness    INTERVAL HISTORY - 12/22/17     I last saw patient in [facility] for consultation for stroke/TIA which I was concerned were due to cardioembolic source possible cancerous based on appearance. He had a negative ANTWAN in the hospital. He also had a EEG in the hospital because of recurrent episodes of confusion which was normal. I recommended Pradaxa for stroke prevention, this was deferred in the setting of bilateral nephrostomy tubes and suprapubic catheter which had previously been affected with bleeding.     He was discharged on full dose ASA 325mg, lisinopril 5mg and continued on simvastatin 40mg daily. His urologist feels he is very high risk for bleeding from the urological sources (has bled in the setting of NO thinners before).     Today they report she is doing better. His energy is better. His appetite has increased, gaining weight, walking without the assistance of walker. He is in PT and Ot. He has had no further TIAs. He is on a 30 day heart monitor - has has not had any events.     He is having nephrostomy tubes changes 1/4/17 and asks when to stop aspirin. He is having active hematuria in the suprapubic catheter which started in the hospital (he had this problem before  "aspirin as well).     ORIGINAL NEURO HISTORY - 12/3/17     Mr. Quinones has been feeling generally unwell for months, since his last episode of sepsis. He was undergoing an outpatient stress test with Dr. Molina on 11/30/17. On the drive back home, he had a 20 min episode of speaking "gibberish" and being very frustrated doing so. Even after his language disturbance resolved, his wife noticed he had a hard time walking. Later that day he had a headache. His wife wanted him to be checked out but he declined. On the morning of presentation, 12/1/17 he woke up at 5am. His wife said his gait was unsteady and it was unusual of him to request a wheelchair to the doctor's office. He went for a routine visit with his oncologist where he was noted to have slow speech and unsteady gait, and was recommended to go to the Ed. In the ED his vitals were 136/110, 72, afebrile and was found to have a left facial droop.      Mr. Quinones also reports having a "TIA" in feb 2017 during which he was confused for a couple of days but it does not seem that he had focal neurological findings. During this time he was on aspirin, and he had been on aspirin for years but in Aug 2017 stopped when he had bleeding from the right nephrostomy tube and had not gone back. He reports no other known neurological events. He feels tired.    UA was "dirty" but this is felt to be chronic.      HOSPITAL COURSE:     12/3/17: patient had ANTWAN today, awaiting results. His wife mentioned to me that during this hospitalization he has not seemed himself, specifically, having intermittent periods of confusion. She is very scared of brining him home.     Review of patient's allergies indicates:   Allergen Reactions    Niacin Rash    Ativan [lorazepam] Hallucinations     Current Outpatient Prescriptions   Medication Sig Dispense Refill    aspirin 325 MG tablet Take 1 tablet (325 mg total) by mouth once daily.  0    bicalutamide (CASODEX) 50 MG Tab Take 50 mg by " mouth once daily.      ciprofloxacin HCl (CIPRO) 500 MG tablet Take 1 tablet (500 mg total) by mouth every 12 (twelve) hours. 20 tablet 0    cyanocobalamin (VITAMIN B-12) 1000 MCG tablet Take 1,000 mcg by mouth once daily.      diazePAM (VALIUM) 10 MG Tab Take 1 tablet (10 mg total) by mouth every 12 (twelve) hours. Takes 5 mg am and 10 mg pm 60 tablet 3    enzalutamide 40 mg Cap Take 160 mg by mouth once daily. 120 capsule 6    fluoxetine (PROZAC) 20 MG capsule TAKE ONE CAPSULE BY MOUTH DAILY 30 capsule 6    folic acid (FOLVITE) 400 MCG tablet Take 400 mcg by mouth once daily.      gabapentin (NEURONTIN) 600 MG tablet Take 600 mg by mouth 3 (three) times daily. Use prior to surgery      leuprolide, 6 month, (ELIGARD) 45 mg (6 month) injection Inject 45 mg into the skin every 3 (three) months. Due February 12, 2018      lisinopril (PRINIVIL,ZESTRIL) 5 MG tablet Take 1 tablet (5 mg total) by mouth once daily. 30 tablet 0    omeprazole (PRILOSEC) 40 MG capsule Take 40 mg by mouth once daily. Use prior to surgery      oxybutynin (DITROPAN) 5 MG Tab Take 5 mg by mouth 2 (two) times daily. Use prior to surgery      simvastatin (ZOCOR) 20 MG tablet Take 2 tablets (40 mg total) by mouth every evening. Use another dose in AM for surgery 60 tablet 0    temazepam (RESTORIL) 15 mg Cap Take 15 mg by mouth nightly as needed.   1     No current facility-administered medications for this visit.        Past Medical History  Past Medical History:   Diagnosis Date    Arthritis     Asbestosis     Cataract     Colorectal cancer     COPD (chronic obstructive pulmonary disease)     Coronary artery disease     stent x 1    Encounter for blood transfusion     Hyperlipidemia     Hypertension     Incontinence     Myocardial infarction     Neuropathy     Prostate cancer     Restless leg     Stroke     TIA (transient ischemic attack) 2016    Trigeminal neuralgia     to face & neck       Past Surgical History  Past  Surgical History:   Procedure Laterality Date    APPENDECTOMY      COLON SURGERY  11/17/2016    colon resection, ileostomy    CORONARY STENT PLACEMENT  1997    EYE SURGERY      bilat cataract, detached retina bilat    Incision and drainage right buttock abscess Right 10/29/15    NEPHROSTOMY Bilateral     super pubic catheter      TONSILLECTOMY      Age 1    VASECTOMY      vasectomy reversed         Family History  Family History   Problem Relation Age of Onset    Alzheimer's disease Mother     Mental illness Mother     Diabetes Mother     Hypertension Mother     Hearing loss Mother     Alzheimer's disease Father     Hearing loss Father     Arthritis Father        Social History  Social History     Social History    Marital status:      Spouse name: N/A    Number of children: N/A    Years of education: N/A     Occupational History    Not on file.     Social History Main Topics    Smoking status: Former Smoker     Packs/day: 1.00     Years: 40.00     Quit date: 1/1/2004    Smokeless tobacco: Never Used    Alcohol use No    Drug use: No    Sexual activity: Yes     Partners: Female     Other Topics Concern    Not on file     Social History Narrative    No narrative on file        Review of Systems  Constitutional: no fever, no chills  Eyes: no change to vision, no redness, no tearing  Ears, nose, mouth, throat: no hearing loss, no tinnitus, no rhinorrhea, no difficulty swallowing   Cardiovascular: no palpitations  Respiratory: no shortness of breath  Gastrointestinal: no diarrhea, no constipation  Musculoskeletal: + joint pain   Skin: no rashes  Neurologic: no numbness, weakness, change to speech, loss of coordination   Endocrine: no heat or cold intolerance     Objective:        Vitals:    12/22/17 1309   BP: 122/60   Pulse: 75   Resp: 18     Body mass index is 26.54 kg/m².    PHYSICAL EXAM:  General: patient appears pale and tired   HEENT: Atraumatic, normocephalic.  Neck: Trachea  midline  Cardiovascular: Vitals reviewed. RRR, no murmurs appreciated. Normal peripheral perfusion.   Pulmonary: No increased work of breathing. Chest sounds clear.   Abdomen/GI: Soft, non tender. No guarding  Musculoskeletal: No obvious joint deformities.     NEUROLOGICAL EXAM:  Mental status: Awake, alert, and oriented to person, place, and time. Recent and remote memory appear to be intact.   Speech/Language: No dysarthria or aphasia on conversation. Described stroke card picture completely, names all objects briskly, reads, and repeats.   Cranial nerves: Visual fields of right eye full, left eye legally blind. Pupils equal round and reactive to light, extraocular movements intact, facial strength intact bilaterally, facial sensation V1-V3 intact bilaterally, palate symmetric, tongue midline, hearing grossly intact bilaterally.  Motor: no pronator drift. No orbit. 5 out of 5 strength throughout the upper and lower extremities bilaterally. Normal bulk and tone. No tremor. No asterixis.   Sensation: Intact to light touch bilaterally.  DTR: 1+ at the knees and biceps bilaterally.  Coordination: Finger-nose-finger testing normal bilaterally.  Gait: deferred      Data Review:     I have personally reviewed the referring provider's notes, labs, & imaging made available to me today.     12/4/17 EEG spot - normal     12/2/17 CTA head / neck - normal     Results for orders placed or performed during the hospital encounter of 12/01/17   MRI Brain Without Contrast    Narrative    Indication: Neurologic deficit, slurred speech, altered mental status.    Procedure:    MRI brain without contrast.    Comparison examination: Noncontrast CT of the brain: 12/1/2017    Technique:    Routine fat and water-weighted sequences were obtained.    Findings:    Limited study secondary to motion artifact.  There is advanced generalized atrophy both centrally and over the convexities without midline shift or mass effect.  Areas of increased  T2 and flair weighted signal change about the ventricular white matter, basal ganglia as well as the brainstem are consistent with areas of remote ischemia and/or demyelinization.    The diffusion sequence demonstrates a small, 5 mm linear focus of diffusion restriction at the level of the left occipital lobe, image 72 series 8.  This is increased in signal on the flair weighted sequence and may simply represent artifactual change, a tiny focal area of subacute infarct would be in the differential.    Normal signal flow-voids present about the skull base.    No acute hemorrhage on the T1 sequence.    Impression    1.  Small focal area of increased diffusion restriction which, although may be artifactual, likely represents an area of subacute infarction within the left occipital lobe but without underlying parenchymal hemorrhage, or midline shift.    2.  Remote ischemic changes with advanced generalized atrophy.      Electronically signed by: MADELINE DHILLON MD  Date:     12/02/17  Time:    11:17    CT Head Without Contrast    Narrative    CT HEAD WITHOUT CONTRAST: 12/01/2017 at 1336    HISTORY: Clinical history is slurred speech, history of colorectal and prostate cancer.  No history of recent trauma or recent surgery is provided.    Comparison: CT head 02/02/2017.    TECHNIQUE: Multiple contiguous axial images were acquired from the base of the skull and the vertex without contrast administration. Total DLP for the study is approximately 586 mGy-cm. Automatic exposure control was utilized.    FINDINGS:No mass effect, hemorrhage or hydrocephalus is appreciated. Symmetric appearance of the soft tissues, orbits. No significant mucosal thickening or paranasal sinus fluid levels are appreciated. No displaced fracture or dislocation is appreciated. Motion, streak artifact is present. Mild volume loss and chronic appearing periventricular white matter changes are appreciated.  There are dense choroid calcifications as  well as dural calcification similar overall with mild atherosclerotic vascular calcifications and partially empty sella variant.  There are some minimal central lacunar changes present similar also. Correlate overall with the patient's clinical findings. No significant interval changes are appreciated.    Impression     No interval mass effect, displaced fracture or hemorrhage is appreciated.  No significant interval intracranial changes are appreciated.        Electronically signed by: CAROL NERI MD  Date:     12/01/17  Time:    13:43    Results for orders placed or performed during the hospital encounter of 02/02/17   CT Head W Wo Contrast    Narrative    CT HEAD WITH AND WITHOUT CONTRAST:    HISTORY:  Frequent falls, confusion, prostate cancer with bone metastasis.    TECHNIQUE: Multiple contiguous axial images were acquired from the base of the skull to the vertex before and following intravenous contrast administration.  Coronal and sagittal reformations were obtained.  Total DLP for the study is approximately 1243 mGy-cm.  Automated exposure control was utilized to reduce radiation dose.      COMPARISON: None available    FINDINGS:    There is diffuse parenchymal atrophy..  There is patchy periventricular and deep white matter hypoattenuation compatible with chronic micro-ischemia.  There is intracranial atherosclerosis.  No evidence of acute intracranial hemorrhage, mass effect, midline deviation, hydrocephalus, or abnormal extra-axial fluid collection is seen. No evidence of acute large vessel territory ischemia is seen. The basilar cisterns are preserved.  No pathologic enhancement is identified.  The visualized paranasal sinuses are clear.  The mastoid air cells are grossly clear.  No acute displaced calvarial abnormality is appreciated.    Impression    1. No acute intracranial process is identified.  2.  No pathologic enhancement identified.      Electronically signed by: CAS ALMANZA MD  Date:      02/02/17  Time:    12:27        Lab Results   Component Value Date     (L) 12/05/2017    K 4.0 12/05/2017    MG 1.9 12/16/2016    CL 97 12/05/2017    CO2 27 12/05/2017    BUN 22 (H) 12/05/2017    CREATININE 1.02 12/05/2017     12/05/2017    HGBA1C 5.1 12/01/2017    AST 12 (L) 12/05/2017    AST 32 04/27/2016    ALT 23 12/05/2017    ALBUMIN 3.6 12/05/2017    PROT 6.7 12/05/2017    BILITOT 0.4 12/05/2017    CHOL 154 12/01/2017    HDL 27 (L) 12/01/2017    LDLCALC 83.0 12/01/2017    TRIG 220 (H) 12/01/2017       Lab Results   Component Value Date    WBC 3.87 (L) 12/05/2017    HGB 9.5 (L) 12/05/2017    HCT 29.2 (L) 12/05/2017    MCV 87 12/05/2017     12/05/2017       No results found for: TSH    Assessment & Plan:       Problem List Items Addressed This Visit        Neuro    TIA due to embolism - Primary    Overview     Mr. Quinones had two neurological events  in time and space. He is right handed. On 11/30/17 he had a 20 min episode of aphasia and on 12/1/17 he had a witnessed left facial droop which is now resolved. He has no acute stroke on imaging to account for each of these events, and these events represent separate vascular distributions.     Based on different vascular distributions and old strokes I have a very high suspicion for cardioembolic source. He does have metastatic prostate cancer but his ddimer was negative implying low likelihood of malignancy related stroke. His ANTWAN was negative for clot or other source of stroke.     Hospital workup did not reveal a source. I think he likely has very occult atrial fibrillation. Collectively decided against pradaxa with his team do to problems with hematuria from the bladder and kidneys due to the suprapubic cath and nephrostomy tube. I support this.     He is on a 30 day event monitor now.    When he is not having his tubes changed he needs to be adherent to a full dose Asa regimen. His cholesterol is under good control.     Continue  current treatment.             Cardiac/Vascular    Mixed hyperlipidemia    Overview     LDL 83. Continue simvastatin 40mg. Will need to recheck next visit          Essential hypertension    Overview     Controlled on medication. Monitor.             Renal/    Anterior urethral stricture    Overview     Status post suprapubic catheter, constant stable hematuria from this. Monitor.                    PLAN:  -- hold aspirin 7 days prior to tube change, resume the following day if not bleeding from tubes, if bleeding, resume once bleeding has stopped  -- otherwise continue Aspirin 325mg daily  -- continue simvastatin 40mg daily       Return in about 3 months (around 3/22/2018).      Jessica Bernardo MD

## 2017-12-26 PROBLEM — I25.10 CORONARY ARTERY DISEASE INVOLVING NATIVE CORONARY ARTERY OF NATIVE HEART WITHOUT ANGINA PECTORIS: Status: ACTIVE | Noted: 2017-12-26

## 2017-12-29 ENCOUNTER — LAB VISIT (OUTPATIENT)
Dept: LAB | Facility: HOSPITAL | Age: 72
End: 2017-12-29
Attending: INTERNAL MEDICINE
Payer: MEDICARE

## 2017-12-29 DIAGNOSIS — C61 PROSTATE CA: ICD-10-CM

## 2017-12-29 LAB
ALBUMIN SERPL BCP-MCNC: 4.2 G/DL
ALP SERPL-CCNC: 80 U/L
ALT SERPL W/O P-5'-P-CCNC: 19 U/L
ANION GAP SERPL CALC-SCNC: 12 MMOL/L
AST SERPL-CCNC: 13 U/L
BASOPHILS # BLD AUTO: 0.04 K/UL
BASOPHILS NFR BLD: 0.7 %
BILIRUB SERPL-MCNC: 0.5 MG/DL
BUN SERPL-MCNC: 24 MG/DL
CALCIUM SERPL-MCNC: 9.2 MG/DL
CHLORIDE SERPL-SCNC: 100 MMOL/L
CO2 SERPL-SCNC: 24 MMOL/L
COMPLEXED PSA SERPL-MCNC: 1.5 NG/ML
CREAT SERPL-MCNC: 1.16 MG/DL
DIFFERENTIAL METHOD: ABNORMAL
EOSINOPHIL # BLD AUTO: 0.2 K/UL
EOSINOPHIL NFR BLD: 3.7 %
ERYTHROCYTE [DISTWIDTH] IN BLOOD BY AUTOMATED COUNT: 16 %
EST. GFR  (AFRICAN AMERICAN): >60 ML/MIN/1.73 M^2
EST. GFR  (NON AFRICAN AMERICAN): >60 ML/MIN/1.73 M^2
GLUCOSE SERPL-MCNC: 107 MG/DL
HCT VFR BLD AUTO: 33 %
HGB BLD-MCNC: 10.4 G/DL
LYMPHOCYTES # BLD AUTO: 0.8 K/UL
LYMPHOCYTES NFR BLD: 13.6 %
MCH RBC QN AUTO: 28.4 PG
MCHC RBC AUTO-ENTMCNC: 31.5 G/DL
MCV RBC AUTO: 90 FL
MONOCYTES # BLD AUTO: 0.6 K/UL
MONOCYTES NFR BLD: 9.7 %
NEUTROPHILS # BLD AUTO: 4.3 K/UL
NEUTROPHILS NFR BLD: 72.3 %
NRBC BLD-RTO: 0 /100 WBC
PLATELET # BLD AUTO: 215 K/UL
PMV BLD AUTO: 9 FL
POTASSIUM SERPL-SCNC: 4.6 MMOL/L
PROT SERPL-MCNC: 7.4 G/DL
RBC # BLD AUTO: 3.66 M/UL
SODIUM SERPL-SCNC: 136 MMOL/L
WBC # BLD AUTO: 5.89 K/UL

## 2017-12-29 PROCEDURE — 85025 COMPLETE CBC W/AUTO DIFF WBC: CPT | Mod: PN

## 2017-12-29 PROCEDURE — 80053 COMPREHEN METABOLIC PANEL: CPT

## 2017-12-29 PROCEDURE — 85025 COMPLETE CBC W/AUTO DIFF WBC: CPT

## 2017-12-29 PROCEDURE — 84153 ASSAY OF PSA TOTAL: CPT | Mod: PN

## 2017-12-29 PROCEDURE — 80053 COMPREHEN METABOLIC PANEL: CPT | Mod: PN

## 2017-12-29 PROCEDURE — 36415 COLL VENOUS BLD VENIPUNCTURE: CPT | Mod: PN

## 2017-12-29 PROCEDURE — 84153 ASSAY OF PSA TOTAL: CPT

## 2018-01-04 PROBLEM — R33.9 BLADDER RETENTION: Status: ACTIVE | Noted: 2018-01-04

## 2018-01-26 ENCOUNTER — LAB VISIT (OUTPATIENT)
Dept: LAB | Facility: HOSPITAL | Age: 73
End: 2018-01-26
Attending: INTERNAL MEDICINE
Payer: MEDICARE

## 2018-01-26 DIAGNOSIS — C20 RECTAL CANCER: ICD-10-CM

## 2018-01-26 DIAGNOSIS — C61 PROSTATE CA: ICD-10-CM

## 2018-01-26 LAB
ALBUMIN SERPL BCP-MCNC: 3.8 G/DL
ALP SERPL-CCNC: 68 U/L
ALT SERPL W/O P-5'-P-CCNC: 21 U/L
ANION GAP SERPL CALC-SCNC: 11 MMOL/L
AST SERPL-CCNC: 11 U/L
BASOPHILS # BLD AUTO: 0.05 K/UL
BASOPHILS NFR BLD: 0.9 %
BILIRUB SERPL-MCNC: 0.3 MG/DL
BUN SERPL-MCNC: 27 MG/DL
CALCIUM SERPL-MCNC: 8.9 MG/DL
CHLORIDE SERPL-SCNC: 105 MMOL/L
CO2 SERPL-SCNC: 23 MMOL/L
COMPLEXED PSA SERPL-MCNC: 1.6 NG/ML
CREAT SERPL-MCNC: 1.09 MG/DL
DIFFERENTIAL METHOD: ABNORMAL
EOSINOPHIL # BLD AUTO: 0.1 K/UL
EOSINOPHIL NFR BLD: 2.6 %
ERYTHROCYTE [DISTWIDTH] IN BLOOD BY AUTOMATED COUNT: 14.6 %
EST. GFR  (AFRICAN AMERICAN): >60 ML/MIN/1.73 M^2
EST. GFR  (NON AFRICAN AMERICAN): >60 ML/MIN/1.73 M^2
GLUCOSE SERPL-MCNC: 103 MG/DL
HCT VFR BLD AUTO: 30.4 %
HGB BLD-MCNC: 9.7 G/DL
LYMPHOCYTES # BLD AUTO: 0.7 K/UL
LYMPHOCYTES NFR BLD: 12.5 %
MCH RBC QN AUTO: 29.1 PG
MCHC RBC AUTO-ENTMCNC: 31.9 G/DL
MCV RBC AUTO: 91 FL
MONOCYTES # BLD AUTO: 0.4 K/UL
MONOCYTES NFR BLD: 8.2 %
NEUTROPHILS # BLD AUTO: 4.1 K/UL
NEUTROPHILS NFR BLD: 75.8 %
NRBC BLD-RTO: 0 /100 WBC
PLATELET # BLD AUTO: 204 K/UL
PMV BLD AUTO: 9.2 FL
POTASSIUM SERPL-SCNC: 3.8 MMOL/L
PROT SERPL-MCNC: 6.7 G/DL
RBC # BLD AUTO: 3.33 M/UL
SODIUM SERPL-SCNC: 139 MMOL/L
WBC # BLD AUTO: 5.37 K/UL

## 2018-01-26 PROCEDURE — 36415 COLL VENOUS BLD VENIPUNCTURE: CPT | Mod: PN

## 2018-01-26 PROCEDURE — 85025 COMPLETE CBC W/AUTO DIFF WBC: CPT | Mod: PN

## 2018-01-26 PROCEDURE — 84153 ASSAY OF PSA TOTAL: CPT | Mod: PN

## 2018-01-26 PROCEDURE — 85025 COMPLETE CBC W/AUTO DIFF WBC: CPT

## 2018-01-26 PROCEDURE — 84153 ASSAY OF PSA TOTAL: CPT

## 2018-01-26 PROCEDURE — 80053 COMPREHEN METABOLIC PANEL: CPT | Mod: PN

## 2018-01-26 PROCEDURE — 80053 COMPREHEN METABOLIC PANEL: CPT

## 2018-02-23 ENCOUNTER — LAB VISIT (OUTPATIENT)
Dept: LAB | Facility: HOSPITAL | Age: 73
End: 2018-02-23
Attending: INTERNAL MEDICINE
Payer: MEDICARE

## 2018-02-23 DIAGNOSIS — C61 PROSTATE CA: ICD-10-CM

## 2018-02-23 LAB
ALBUMIN SERPL BCP-MCNC: 3.7 G/DL
ALP SERPL-CCNC: 86 U/L
ALT SERPL W/O P-5'-P-CCNC: 20 U/L
ANION GAP SERPL CALC-SCNC: 11 MMOL/L
AST SERPL-CCNC: 13 U/L
BASOPHILS # BLD AUTO: 0.04 K/UL
BASOPHILS NFR BLD: 0.7 %
BILIRUB SERPL-MCNC: 0.4 MG/DL
BUN SERPL-MCNC: 18 MG/DL
CALCIUM SERPL-MCNC: 9.2 MG/DL
CHLORIDE SERPL-SCNC: 102 MMOL/L
CO2 SERPL-SCNC: 24 MMOL/L
COMPLEXED PSA SERPL-MCNC: 1.9 NG/ML
CREAT SERPL-MCNC: 1.11 MG/DL
DIFFERENTIAL METHOD: ABNORMAL
EOSINOPHIL # BLD AUTO: 0.2 K/UL
EOSINOPHIL NFR BLD: 4 %
ERYTHROCYTE [DISTWIDTH] IN BLOOD BY AUTOMATED COUNT: 14.3 %
EST. GFR  (AFRICAN AMERICAN): >60 ML/MIN/1.73 M^2
EST. GFR  (NON AFRICAN AMERICAN): >60 ML/MIN/1.73 M^2
GLUCOSE SERPL-MCNC: 95 MG/DL
HCT VFR BLD AUTO: 28.4 %
HGB BLD-MCNC: 9.4 G/DL
LYMPHOCYTES # BLD AUTO: 0.7 K/UL
LYMPHOCYTES NFR BLD: 11.4 %
MCH RBC QN AUTO: 29.2 PG
MCHC RBC AUTO-ENTMCNC: 33.1 G/DL
MCV RBC AUTO: 88 FL
MONOCYTES # BLD AUTO: 0.6 K/UL
MONOCYTES NFR BLD: 9.8 %
NEUTROPHILS # BLD AUTO: 4.2 K/UL
NEUTROPHILS NFR BLD: 74.1 %
NRBC BLD-RTO: 0 /100 WBC
PLATELET # BLD AUTO: 251 K/UL
PMV BLD AUTO: 9.1 FL
POTASSIUM SERPL-SCNC: 4.1 MMOL/L
PROT SERPL-MCNC: 6.9 G/DL
RBC # BLD AUTO: 3.22 M/UL
SODIUM SERPL-SCNC: 137 MMOL/L
WBC # BLD AUTO: 5.7 K/UL

## 2018-02-23 PROCEDURE — 80053 COMPREHEN METABOLIC PANEL: CPT | Mod: PN

## 2018-02-23 PROCEDURE — 84153 ASSAY OF PSA TOTAL: CPT

## 2018-02-23 PROCEDURE — 80053 COMPREHEN METABOLIC PANEL: CPT

## 2018-02-23 PROCEDURE — 36415 COLL VENOUS BLD VENIPUNCTURE: CPT | Mod: PN

## 2018-02-23 PROCEDURE — 84153 ASSAY OF PSA TOTAL: CPT | Mod: PN

## 2018-02-23 PROCEDURE — 85025 COMPLETE CBC W/AUTO DIFF WBC: CPT

## 2018-02-23 PROCEDURE — 85025 COMPLETE CBC W/AUTO DIFF WBC: CPT | Mod: PN

## 2018-03-08 ENCOUNTER — LAB VISIT (OUTPATIENT)
Dept: LAB | Facility: HOSPITAL | Age: 73
End: 2018-03-08
Attending: SURGERY
Payer: MEDICARE

## 2018-03-08 ENCOUNTER — OFFICE VISIT (OUTPATIENT)
Dept: SURGERY | Facility: CLINIC | Age: 73
End: 2018-03-08
Payer: MEDICARE

## 2018-03-08 VITALS
HEIGHT: 70 IN | DIASTOLIC BLOOD PRESSURE: 68 MMHG | WEIGHT: 194.88 LBS | SYSTOLIC BLOOD PRESSURE: 138 MMHG | HEART RATE: 68 BPM | TEMPERATURE: 98 F | BODY MASS INDEX: 27.9 KG/M2

## 2018-03-08 DIAGNOSIS — Z85.048 HISTORY OF RECTAL CANCER: Primary | ICD-10-CM

## 2018-03-08 DIAGNOSIS — C20 RECTAL CANCER: ICD-10-CM

## 2018-03-08 PROCEDURE — 99999 PR PBB SHADOW E&M-EST. PATIENT-LVL III: CPT | Mod: PBBFAC,,, | Performed by: SURGERY

## 2018-03-08 PROCEDURE — 82378 CARCINOEMBRYONIC ANTIGEN: CPT

## 2018-03-08 PROCEDURE — 46600 DIAGNOSTIC ANOSCOPY SPX: CPT | Mod: S$PBB,,, | Performed by: SURGERY

## 2018-03-08 PROCEDURE — 36415 COLL VENOUS BLD VENIPUNCTURE: CPT | Mod: PO

## 2018-03-08 PROCEDURE — 99212 OFFICE O/P EST SF 10 MIN: CPT | Mod: S$PBB,25,, | Performed by: SURGERY

## 2018-03-08 PROCEDURE — 99213 OFFICE O/P EST LOW 20 MIN: CPT | Mod: PBBFAC,PO | Performed by: SURGERY

## 2018-03-08 PROCEDURE — 46600 DIAGNOSTIC ANOSCOPY SPX: CPT | Mod: PBBFAC,PO | Performed by: SURGERY

## 2018-03-09 LAB — CEA SERPL-MCNC: 1.2 NG/ML

## 2018-03-09 NOTE — PROGRESS NOTES
Omari 72 yo M whom I am familiar with. Pt is s/p LAR for a ST I distal rectal cancer in 11/16.  PT history is complicated by metastatic prostate cancer currently controlled with xtandi.  Pt with suprapubic catheter following radiation for prostate cancer.  Recentl required nephrostomy tubes ssecondary to UTI.  Pt since last visit has had CVA with some residual dementia.     Awake  CTA B  Sinus  Soft/nt/nd  Rectal anastomosis feels patent no mass noted.  Anoscopy performed with no mass nted.     Lab Results   Component Value Date    CEA 1.6 10/20/2017       A/P: HIstory of ST I rectal cancer  Check cea today  Pt to f/u with DR Delgado for cscope.

## 2018-03-22 ENCOUNTER — OFFICE VISIT (OUTPATIENT)
Dept: NEUROLOGY | Facility: CLINIC | Age: 73
End: 2018-03-22
Payer: MEDICARE

## 2018-03-22 ENCOUNTER — HOSPITAL ENCOUNTER (OUTPATIENT)
Dept: RADIOLOGY | Facility: HOSPITAL | Age: 73
Discharge: HOME | End: 2018-03-22
Attending: PSYCHIATRY & NEUROLOGY
Payer: MEDICARE

## 2018-03-22 VITALS
BODY MASS INDEX: 27.76 KG/M2 | SYSTOLIC BLOOD PRESSURE: 149 MMHG | WEIGHT: 193.88 LBS | DIASTOLIC BLOOD PRESSURE: 75 MMHG | HEIGHT: 70 IN | RESPIRATION RATE: 16 BRPM | HEART RATE: 72 BPM

## 2018-03-22 DIAGNOSIS — R41.82 ALTERED MENTAL STATUS, UNSPECIFIED ALTERED MENTAL STATUS TYPE: Primary | ICD-10-CM

## 2018-03-22 DIAGNOSIS — Z86.73 HISTORY OF STROKE: ICD-10-CM

## 2018-03-22 DIAGNOSIS — S09.90XA TRAUMATIC INJURY OF HEAD, INITIAL ENCOUNTER: ICD-10-CM

## 2018-03-22 DIAGNOSIS — R41.82 ALTERED MENTAL STATUS, UNSPECIFIED ALTERED MENTAL STATUS TYPE: ICD-10-CM

## 2018-03-22 DIAGNOSIS — I74.9 TIA DUE TO EMBOLISM: ICD-10-CM

## 2018-03-22 DIAGNOSIS — G45.9 TIA DUE TO EMBOLISM: ICD-10-CM

## 2018-03-22 PROCEDURE — 99214 OFFICE O/P EST MOD 30 MIN: CPT | Mod: S$PBB,,, | Performed by: PSYCHIATRY & NEUROLOGY

## 2018-03-22 PROCEDURE — 70450 CT HEAD/BRAIN W/O DYE: CPT | Mod: 26,,, | Performed by: RADIOLOGY

## 2018-03-22 PROCEDURE — 99214 OFFICE O/P EST MOD 30 MIN: CPT | Mod: PBBFAC,25,PO | Performed by: PSYCHIATRY & NEUROLOGY

## 2018-03-22 PROCEDURE — 99999 PR PBB SHADOW E&M-EST. PATIENT-LVL IV: CPT | Mod: PBBFAC,,, | Performed by: PSYCHIATRY & NEUROLOGY

## 2018-03-22 PROCEDURE — 70450 CT HEAD/BRAIN W/O DYE: CPT | Mod: TC,PO

## 2018-03-22 NOTE — PATIENT INSTRUCTIONS
-- hold aspirin until you get your CT head done  -- continue simvastatin  -- get urgent CT head done to make sure you have no bleed in your brain  -- I'll be in contact with you afterwards

## 2018-03-22 NOTE — PROGRESS NOTES
"Date of service: 3/22/2018  Referring provider: No ref. provider found    Subjective:      Chief complaint: Follow-up       Patient ID: Chester Quinones is a 73 y.o. 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 His trigeminal neuralgia (right) is back - nose, lip, eye. He does not describe this as a pain but as a "symptom, like a nerve, it is so minor." He fell 2 months ago, on the grass, hitting the left forehead, but no more recent head trauma. He was treated for a UTI one month ago, no recent fevers or chills or other systemic symptoms.     INTERVAL HISTORY - 12/22/17     I last saw patient in [facility] for consultation for stroke/TIA which I was concerned were due to cardioembolic source possible cancerous based on appearance. He had a negative ANTWAN in the hospital. He also had a EEG in the hospital because of recurrent episodes of confusion which was normal. I recommended Pradaxa for stroke prevention, this was deferred in the setting of bilateral nephrostomy tubes and suprapubic catheter which had previously been affected with bleeding.     He was discharged on full dose ASA 325mg, lisinopril 5mg and continued on simvastatin 40mg daily. His urologist feels he is very high risk for bleeding from the urological sources (has bled in the setting of NO thinners before).     Today they report she is doing better. His " "energy is better. His appetite has increased, gaining weight, walking without the assistance of walker. He is in PT and Ot. He has had no further TIAs. He is on a 30 day heart monitor - has has not had any events.     He is having nephrostomy tubes changes 1/4/17 and asks when to stop aspirin. He is having active hematuria in the suprapubic catheter which started in the hospital (he had this problem before aspirin as well).     ORIGINAL NEURO HISTORY - 12/3/17     Mr. Quinones has been feeling generally unwell for months, since his last episode of sepsis. He was undergoing an outpatient stress test with Dr. Molina on 11/30/17. On the drive back home, he had a 20 min episode of speaking "gibberish" and being very frustrated doing so. Even after his language disturbance resolved, his wife noticed he had a hard time walking. Later that day he had a headache. His wife wanted him to be checked out but he declined. On the morning of presentation, 12/1/17 he woke up at 5am. His wife said his gait was unsteady and it was unusual of him to request a wheelchair to the doctor's office. He went for a routine visit with his oncologist where he was noted to have slow speech and unsteady gait, and was recommended to go to the Ed. In the ED his vitals were 136/110, 72, afebrile and was found to have a left facial droop.      Mr. Quinones also reports having a "TIA" in feb 2017 during which he was confused for a couple of days but it does not seem that he had focal neurological findings. During this time he was on aspirin, and he had been on aspirin for years but in Aug 2017 stopped when he had bleeding from the right nephrostomy tube and had not gone back. He reports no other known neurological events. He feels tired.    UA was "dirty" but this is felt to be chronic.      HOSPITAL COURSE:     12/3/17: patient had ANTWAN today, awaiting results. His wife mentioned to me that during this hospitalization he has not seemed himself, " specifically, having intermittent periods of confusion. She is very scared of brining him home.     Review of patient's allergies indicates:   Allergen Reactions    Niacin Rash    Ativan [lorazepam] Hallucinations     Current Outpatient Prescriptions   Medication Sig Dispense Refill    aspirin 325 MG tablet Take 1 tablet (325 mg total) by mouth once daily.  0    bicalutamide (CASODEX) 50 MG Tab Take 50 mg by mouth once daily.      ciprofloxacin HCl (CIPRO) 500 MG tablet Take 1 tablet (500 mg total) by mouth every 12 (twelve) hours. 20 tablet 0    cyanocobalamin (VITAMIN B-12) 1000 MCG tablet Take 1,000 mcg by mouth once daily.      diazePAM (VALIUM) 10 MG Tab Take 1 tablet (10 mg total) by mouth every 12 (twelve) hours. Takes 5 mg am and 10 mg pm 60 tablet 3    enzalutamide 40 mg Cap Take 160 mg by mouth once daily. 120 capsule 6    FLUoxetine (PROZAC) 20 MG capsule Take 1 capsule (20 mg total) by mouth once daily. 30 capsule 6    folic acid (FOLVITE) 400 MCG tablet Take 400 mcg by mouth once daily.      gabapentin (NEURONTIN) 600 MG tablet Take 600 mg by mouth 3 (three) times daily. Use prior to surgery      leuprolide, 6 month, (ELIGARD) 45 mg (6 month) injection Inject 45 mg into the skin every 3 (three) months. Due February 12, 2018      omeprazole (PRILOSEC) 40 MG capsule Take 40 mg by mouth once daily. Use prior to surgery      oxybutynin (DITROPAN) 5 MG Tab Take 5 mg by mouth 2 (two) times daily. Use prior to surgery      simvastatin (ZOCOR) 20 MG tablet Take 2 tablets (40 mg total) by mouth every evening. Use another dose in AM for surgery 60 tablet 0    temazepam (RESTORIL) 15 mg Cap Take 1 capsule (15 mg total) by mouth nightly as needed. 30 capsule 3    lisinopril (PRINIVIL,ZESTRIL) 5 MG tablet Take 1 tablet (5 mg total) by mouth once daily. 30 tablet 0     No current facility-administered medications for this visit.        Past Medical History  Past Medical History:   Diagnosis Date     Anemia     wife stated he bleeds from right kidney    Arthritis     Asbestosis     Cataract     Colorectal cancer 2015    COPD (chronic obstructive pulmonary disease)     Coronary artery disease     stent x 1    Encounter for blood transfusion     History of suprapubic catheter     Hyperlipidemia     Hypertension     Incontinence     Myocardial infarction     Neuropathy     Prostate cancer 2005    and in 2010    Radiation injury     after prostate cancer treatment    Restless leg     Septic shock     Stroke     12/1/17    TIA (transient ischemic attack) 2016    Trigeminal neuralgia     to face & neck       Past Surgical History  Past Surgical History:   Procedure Laterality Date    APPENDECTOMY      COLON SURGERY  11/17/2016    colon resection, ileostomy    CORONARY STENT PLACEMENT  1997    EYE SURGERY      bilat cataract, detached retina bilat    Incision and drainage right buttock abscess Right 10/29/15    NEPHROSTOMY Bilateral     super pubic catheter      TONSILLECTOMY      Age 1    VASECTOMY      vasectomy reversed         Family History  Family History   Problem Relation Age of Onset    Alzheimer's disease Mother     Mental illness Mother     Diabetes Mother     Hypertension Mother     Hearing loss Mother     Alzheimer's disease Father     Hearing loss Father     Arthritis Father        Social History  Social History     Social History    Marital status:      Spouse name: N/A    Number of children: N/A    Years of education: N/A     Occupational History    Not on file.     Social History Main Topics    Smoking status: Former Smoker     Packs/day: 1.00     Years: 40.00     Quit date: 1/1/2004    Smokeless tobacco: Never Used    Alcohol use No    Drug use: No    Sexual activity: Yes     Partners: Female     Other Topics Concern    Not on file     Social History Narrative    No narrative on file        Review of Systems  Constitutional: no fever, no  chills  Eyes: no change to vision, no redness, no tearing  Ears, nose, mouth, throat: no hearing loss, + tinnitus, no rhinorrhea, no difficulty swallowing   Cardiovascular: no palpitations  Respiratory: no shortness of breath +cough/wheezing   Gastrointestinal: no diarrhea, no constipation  Gu: + hematuria (chronic)   Musculoskeletal: + joint pain   Skin: no rashes  Neurologic: + numbness, weakness, change to speech, loss of coordination   Endocrine: + heat or cold intolerance     Objective:        Vitals:    03/22/18 1325   BP: (!) 149/75   Pulse: 72   Resp: 16     Body mass index is 27.82 kg/m².    PHYSICAL EXAM:  General: patient appears aloof   HEENT: Atraumatic, normocephalic.  Neck: Trachea midline  Cardiovascular: Vitals reviewed. RRR, no murmurs appreciated. Normal peripheral perfusion.   Pulmonary: No increased work of breathing. Chest sounds clear.   Abdomen/GI: Soft, non tender. No guarding  Musculoskeletal: No obvious joint deformities.     NEUROLOGICAL EXAM:  Mental status: Awake, alert, and oriented to person, place, and time. Recent and remote memory appear to be intact. MOCA 27/30 (normal >26)  Speech/Language: No aphasia on conversation. Intermittently, speech is slightly slurred. Cranial nerves: Visual fields of right eye full, left eye legally blind. Pupils equal round and reactive to light, extraocular movements intact, facial strength intact bilaterally, facial sensation V1-V3 intact bilaterally, palate symmetric, tongue midline, hearing grossly intact bilaterally.  Motor: no pronator drift. No orbit. 5 out of 5 strength throughout the upper and lower extremities bilaterally. Normal bulk and tone. No tremor. No asterixis.   Sensation: Intact to light touch bilaterally.  DTR: 1+ at the knees and biceps bilaterally.  Coordination: Finger-nose-finger testing normal bilaterally.  Gait: normal          Data Review:     I have personally reviewed the referring provider's notes, labs, & imaging made  available to me today.     12/4/17 EEG spot - normal     12/2/17 CTA head / neck - normal     Results for orders placed or performed during the hospital encounter of 12/01/17   MRI Brain Without Contrast    Narrative    Indication: Neurologic deficit, slurred speech, altered mental status.    Procedure:    MRI brain without contrast.    Comparison examination: Noncontrast CT of the brain: 12/1/2017    Technique:    Routine fat and water-weighted sequences were obtained.    Findings:    Limited study secondary to motion artifact.  There is advanced generalized atrophy both centrally and over the convexities without midline shift or mass effect.  Areas of increased T2 and flair weighted signal change about the ventricular white matter, basal ganglia as well as the brainstem are consistent with areas of remote ischemia and/or demyelinization.    The diffusion sequence demonstrates a small, 5 mm linear focus of diffusion restriction at the level of the left occipital lobe, image 72 series 8.  This is increased in signal on the flair weighted sequence and may simply represent artifactual change, a tiny focal area of subacute infarct would be in the differential.    Normal signal flow-voids present about the skull base.    No acute hemorrhage on the T1 sequence.    Impression    1.  Small focal area of increased diffusion restriction which, although may be artifactual, likely represents an area of subacute infarction within the left occipital lobe but without underlying parenchymal hemorrhage, or midline shift.    2.  Remote ischemic changes with advanced generalized atrophy.      Electronically signed by: MADELINE DHILLON MD  Date:     12/02/17  Time:    11:17    CT Head Without Contrast    Narrative    CT HEAD WITHOUT CONTRAST: 12/01/2017 at 1336    HISTORY: Clinical history is slurred speech, history of colorectal and prostate cancer.  No history of recent trauma or recent surgery is provided.    Comparison: CT head  02/02/2017.    TECHNIQUE: Multiple contiguous axial images were acquired from the base of the skull and the vertex without contrast administration. Total DLP for the study is approximately 586 mGy-cm. Automatic exposure control was utilized.    FINDINGS:No mass effect, hemorrhage or hydrocephalus is appreciated. Symmetric appearance of the soft tissues, orbits. No significant mucosal thickening or paranasal sinus fluid levels are appreciated. No displaced fracture or dislocation is appreciated. Motion, streak artifact is present. Mild volume loss and chronic appearing periventricular white matter changes are appreciated.  There are dense choroid calcifications as well as dural calcification similar overall with mild atherosclerotic vascular calcifications and partially empty sella variant.  There are some minimal central lacunar changes present similar also. Correlate overall with the patient's clinical findings. No significant interval changes are appreciated.    Impression     No interval mass effect, displaced fracture or hemorrhage is appreciated.  No significant interval intracranial changes are appreciated.        Electronically signed by: CAROL NERI MD  Date:     12/01/17  Time:    13:43    Results for orders placed or performed during the hospital encounter of 02/02/17   CT Head W Wo Contrast    Narrative    CT HEAD WITH AND WITHOUT CONTRAST:    HISTORY:  Frequent falls, confusion, prostate cancer with bone metastasis.    TECHNIQUE: Multiple contiguous axial images were acquired from the base of the skull to the vertex before and following intravenous contrast administration.  Coronal and sagittal reformations were obtained.  Total DLP for the study is approximately 1243 mGy-cm.  Automated exposure control was utilized to reduce radiation dose.      COMPARISON: None available    FINDINGS:    There is diffuse parenchymal atrophy..  There is patchy periventricular and deep white matter hypoattenuation  compatible with chronic micro-ischemia.  There is intracranial atherosclerosis.  No evidence of acute intracranial hemorrhage, mass effect, midline deviation, hydrocephalus, or abnormal extra-axial fluid collection is seen. No evidence of acute large vessel territory ischemia is seen. The basilar cisterns are preserved.  No pathologic enhancement is identified.  The visualized paranasal sinuses are clear.  The mastoid air cells are grossly clear.  No acute displaced calvarial abnormality is appreciated.    Impression    1. No acute intracranial process is identified.  2.  No pathologic enhancement identified.      Electronically signed by: CAS ALMANZA MD  Date:     02/02/17  Time:    12:27        Lab Results   Component Value Date     02/23/2018    K 4.1 02/23/2018    MG 1.9 12/16/2016     02/23/2018    CO2 24 02/23/2018    BUN 18 02/23/2018    CREATININE 1.11 02/23/2018    GLU 95 02/23/2018    HGBA1C 5.1 12/01/2017    AST 13 (L) 02/23/2018    AST 32 04/27/2016    ALT 20 02/23/2018    ALBUMIN 3.7 02/23/2018    PROT 6.9 02/23/2018    BILITOT 0.4 02/23/2018    CHOL 154 12/01/2017    HDL 27 (L) 12/01/2017    LDLCALC 83.0 12/01/2017    TRIG 220 (H) 12/01/2017       Lab Results   Component Value Date    WBC 5.70 02/23/2018    HGB 9.4 (L) 02/23/2018    HCT 28.4 (L) 02/23/2018    MCV 88 02/23/2018     02/23/2018       Lab Results   Component Value Date    TSH 2.170 02/23/2018       Assessment & Plan:       Problem List Items Addressed This Visit        Neuro    TIA due to embolism    Overview     Mr. Quinones had two neurological events  in time and space. He is right handed. On 11/30/17 he had a 20 min episode of aphasia and on 12/1/17 he had a witnessed left facial droop which is now resolved. He has no acute stroke on imaging to account for each of these events, and these events represent separate vascular distributions.     Based on different vascular distributions and old strokes I have a  very high suspicion for cardioembolic source. He does have metastatic prostate cancer but his ddimer was negative implying low likelihood of malignancy related stroke. His ANTWAN was negative for clot or other source of stroke.     Hospital workup did not reveal a source. I think he likely has very occult atrial fibrillation. Collectively decided against pradaxa with his team do to problems with hematuria from the bladder and kidneys due to the suprapubic cath and nephrostomy tube. I support this.     He is on a 30 day event monitor now.    When he is not having his tubes changed he needs to be adherent to a full dose Asa regimen. His cholesterol is under good control.     Continue current treatment.          History of stroke    Overview     He has numerous other embolic looking strokes of different ages on his head CT and brain MRI - there is a subacute appearing left occipital lobe stroke in the distribution of the left PCA which can not account for any of the symptoms he presented with and likely happened in the recent days to weeks as a subclinical event (he reported no change to the right visual field), there is a left cerebellar hemisphere old stroke (encephalomalacia) and a right deep frontal lobe old lacunar stroke (encephalomalacia) both which occurred prior to 2/2017 as they were already present on CT head then, despite Mr. Quinones being on aspirin at that time.                 Other Visit Diagnoses     Altered mental status, unspecified altered mental status type    -  Primary    Relevant Orders    CT Head Without Contrast (Completed)    Traumatic injury of head, initial encounter        Relevant Orders    CT Head Without Contrast (Completed)              Patient presented for stroke follow up, routine, but wife also reported abrupt occurrence of confusion x 2-3 weeks. He had a fall hitting his head 2 months ago, and is on aspirin, so I ordered a stat CT head evaluating for SDH which he had done today  immediately after the visit and was normal with the exception of the old, known strokes. His MOCA score of 27 is NOT consistent with dementia or even mild cognitive impairment. I suspect something systemic could be going on with him, like another brewing UTI giving him this confusion and intermittent mild slurred speech (probably recrudescence) so after the visit, when I had the CT head results, I instructed his wife to schedule a visit with his PCP for a check up.     We will address his trigeminal neuralgia and put him back on a neuromodulator once confusion has improved.     PLAN:  -- hold aspirin 7 days prior to tube change, resume the following day if not bleeding from tubes, if bleeding, resume once bleeding has stopped  -- otherwise continue Aspirin 325mg daily  -- continue simvastatin 40mg daily       Follow-up in about 4 weeks (around 4/19/2018).      Jessica Bernardo MD

## 2018-03-26 ENCOUNTER — LAB VISIT (OUTPATIENT)
Dept: LAB | Facility: HOSPITAL | Age: 73
End: 2018-03-26
Attending: INTERNAL MEDICINE
Payer: MEDICARE

## 2018-03-26 DIAGNOSIS — C61 PROSTATE CA: ICD-10-CM

## 2018-03-26 DIAGNOSIS — D50.0 IRON DEFICIENCY ANEMIA DUE TO CHRONIC BLOOD LOSS: ICD-10-CM

## 2018-03-26 DIAGNOSIS — E03.9 HYPOTHYROIDISM, UNSPECIFIED TYPE: ICD-10-CM

## 2018-03-26 DIAGNOSIS — E53.8 B12 DEFICIENCY: ICD-10-CM

## 2018-03-26 LAB
ALBUMIN SERPL BCP-MCNC: 3.9 G/DL
ALP SERPL-CCNC: 82 U/L
ALT SERPL W/O P-5'-P-CCNC: 15 U/L
ANION GAP SERPL CALC-SCNC: 12 MMOL/L
AST SERPL-CCNC: 12 U/L
BASOPHILS # BLD AUTO: 0.04 K/UL
BASOPHILS NFR BLD: 0.7 %
BILIRUB SERPL-MCNC: 0.4 MG/DL
BUN SERPL-MCNC: 25 MG/DL
CALCIUM SERPL-MCNC: 9.6 MG/DL
CHLORIDE SERPL-SCNC: 102 MMOL/L
CO2 SERPL-SCNC: 25 MMOL/L
COMPLEXED PSA SERPL-MCNC: 1.6 NG/ML
CREAT SERPL-MCNC: 1.22 MG/DL
DIFFERENTIAL METHOD: ABNORMAL
EOSINOPHIL # BLD AUTO: 0.3 K/UL
EOSINOPHIL NFR BLD: 4.3 %
ERYTHROCYTE [DISTWIDTH] IN BLOOD BY AUTOMATED COUNT: 14.9 %
EST. GFR  (AFRICAN AMERICAN): >60 ML/MIN/1.73 M^2
EST. GFR  (NON AFRICAN AMERICAN): 58 ML/MIN/1.73 M^2
GLUCOSE SERPL-MCNC: 102 MG/DL
HCT VFR BLD AUTO: 30.7 %
HGB BLD-MCNC: 9.9 G/DL
LYMPHOCYTES # BLD AUTO: 0.9 K/UL
LYMPHOCYTES NFR BLD: 15.1 %
MCH RBC QN AUTO: 28.1 PG
MCHC RBC AUTO-ENTMCNC: 32.2 G/DL
MCV RBC AUTO: 87 FL
MONOCYTES # BLD AUTO: 0.5 K/UL
MONOCYTES NFR BLD: 8.7 %
NEUTROPHILS # BLD AUTO: 4.3 K/UL
NEUTROPHILS NFR BLD: 71.2 %
NRBC BLD-RTO: 0 /100 WBC
PLATELET # BLD AUTO: 263 K/UL
PMV BLD AUTO: 9.1 FL
POTASSIUM SERPL-SCNC: 4.8 MMOL/L
PROT SERPL-MCNC: 6.8 G/DL
RBC # BLD AUTO: 3.52 M/UL
SODIUM SERPL-SCNC: 139 MMOL/L
WBC # BLD AUTO: 6.09 K/UL

## 2018-03-26 PROCEDURE — 80053 COMPREHEN METABOLIC PANEL: CPT | Mod: PN

## 2018-03-26 PROCEDURE — 84153 ASSAY OF PSA TOTAL: CPT | Mod: PN

## 2018-03-26 PROCEDURE — 85025 COMPLETE CBC W/AUTO DIFF WBC: CPT | Mod: PN

## 2018-03-26 PROCEDURE — 85025 COMPLETE CBC W/AUTO DIFF WBC: CPT

## 2018-03-26 PROCEDURE — 36415 COLL VENOUS BLD VENIPUNCTURE: CPT | Mod: PN

## 2018-03-26 PROCEDURE — 80053 COMPREHEN METABOLIC PANEL: CPT

## 2018-03-26 PROCEDURE — 84153 ASSAY OF PSA TOTAL: CPT

## 2018-04-09 ENCOUNTER — TELEPHONE (OUTPATIENT)
Dept: NEUROLOGY | Facility: CLINIC | Age: 73
End: 2018-04-09

## 2018-04-09 DIAGNOSIS — G50.0 TRIGEMINAL NEURALGIA OF RIGHT SIDE OF FACE: Primary | ICD-10-CM

## 2018-04-09 RX ORDER — OXCARBAZEPINE 300 MG/1
300 TABLET, FILM COATED ORAL 2 TIMES DAILY
Qty: 60 TABLET | Refills: 3 | Status: SHIPPED | OUTPATIENT
Start: 2018-04-09 | End: 2018-10-01 | Stop reason: SDUPTHER

## 2018-04-09 NOTE — TELEPHONE ENCOUNTER
"Called and spoke with patient's wife. Informed her per Dr. Bernardo, "I sent in oxcarbazepine (trileptal) to the pharmacy for 300mg BID. Please let patient's wife know to begin the medication as half tablet BID according to the chart below. This is related to Tegretol but better tolerated and will not interfere with his other medications. If he gets too confused or dizzy on it, he needs to reduce to half tablet once a day until better tolerated then increase again.     ------------------------------------------------   Oxcarbazepine dosing using 300mg tabs     # of Tablets                                    Check off Each Day   Morning Evening 1 2 3 (4) (5)   1/2           1/2   1/2           1   1           1   1         1 1/2   1 1/2         1 1/2   1 1/2     2   2            2     Do not increase dose by more than ½ tablet per day every 3 days. If needed, you can increase more slowly than suggested. Stop increasing your dose if you experience side effects. If your condition responds to the medication at a lower strength, stop increasing your dose - the idea is to find the minimally effective strength you need to feel better. If you are better at 1 pill twice a day, then that is your dose! "    Patient's wife Anna. Verbalized understanding.   "

## 2018-04-09 NOTE — PROGRESS NOTES
Received message today that patient was having recurrence of his trigeminal neuralgia in the right forehead and would like to start back on medication for this.    I had recommended back in Dec 2017 for patient to stop Tegretol (he was doing well from TN standpoint) as it had an interaction with his Xtandi for prostate cancer suppression (lowered levels). At last visit in the office when TN was starting to come back I recommending holding off on starting new medication as patient was experiencing altered mental status which I did not want to worsen.    Now, his wife reports his mental status is back to baseline.    I ran interaction check with Lupron, casodex, and Xtandi (all 3 prostate cancer meds) and oxcarbazepine (trileptal ) is OK to use - levels of trileptal may be lowered by Lupron but trleptal will not alter levels of the other meds. Patient had labs 2 weeks ago with normal LFTs so will send in trileptal to his pharmacy according to the following schedule:    Oxcarbazepine (Trileptal brand)     Oxcarbazepine is an anti-epileptic medication that can be beneficial for painful neurological conditions. While it is only FDA approved for certain types of seizures, it has proven useful in the treatment of trigeminal and other painful neuralgias. It does not appear to help with migraines. While it is generally quite safe and well tolerated, it may cause side effects, including but not limited to the following:   Dizziness   Rash   Low blood sodium (blood test)   Sleepiness / sedation   Unsteadiness   Difficulty thinking or concentrating   Suicidal thoughts (a rare problem with any seizure drug)     Serious reactions are rare. Dosing should be increased gradually. It is usually administered twice a day. Use the following schedule as a guide to find the lowest dose that seems to be effective for you. Check off each dose increase as you go. Usually the dose is increased every 3-5 days.     Low dosing is done with 300mg  tablets, higher dosing with 600mg tablets. The tablets can be easily split with a knife or pill-cutter.     Oxcarbazepine dosing using 300mg tabs   # of Tablets                                    Check off Each Day   Morning  Evening  1  2  3  (4)  (5)    1/2  1/2         1/2  1         1  1         1  1 1/2         1 1/2  1 1/2         1 1/2  2         2  2           Do not increase dose by more than ½ tablet per day every 3 days. If needed, you can increase more slowly than suggested. Stop increasing your dose if you experience side effects. If your condition responds to the medication at a lower strength, stop increasing your dose - the idea is to find the minimally effective strength you need to feel better. If you are better at 1 pill twice a day, then that is your dose!

## 2018-04-09 NOTE — TELEPHONE ENCOUNTER
Returned call and spoke with Janelle. Patient said he is having sharp shooting pains above the right eye. Patient said the pains are almost consistant since stopping Tegretol. Patient said it feels like it is his normal trigeminal neuralgia pain, just constant.    Patient's wife, Anna said the patient's confusion has gotten better since stopping the Tegretol. Informed Janelle that Dr. Bernardo will be sending something over to the patient's pharmacy, [facility] for the patient to take. Janelle and the patient verbalized understanding.

## 2018-04-09 NOTE — TELEPHONE ENCOUNTER
----- Message from Lola Hamilton sent at 4/9/2018  8:47 AM CDT -----  Contact: Janelle [facility]  Patient is having severe shooting pains near eye area which started every now and then and now is almost continous.  Thought he could wait until his up coming appt but now needs help now.  Call Janelle [phone].  Thank you

## 2018-04-16 PROBLEM — R31.9 HEMATURIA: Status: ACTIVE | Noted: 2018-04-16

## 2018-04-20 ENCOUNTER — OFFICE VISIT (OUTPATIENT)
Dept: NEUROLOGY | Facility: CLINIC | Age: 73
End: 2018-04-20
Payer: MEDICARE

## 2018-04-20 ENCOUNTER — LAB VISIT (OUTPATIENT)
Dept: LAB | Facility: HOSPITAL | Age: 73
End: 2018-04-20
Attending: INTERNAL MEDICINE
Payer: MEDICARE

## 2018-04-20 VITALS
HEART RATE: 65 BPM | WEIGHT: 200 LBS | BODY MASS INDEX: 28.63 KG/M2 | RESPIRATION RATE: 16 BRPM | DIASTOLIC BLOOD PRESSURE: 69 MMHG | SYSTOLIC BLOOD PRESSURE: 132 MMHG | HEIGHT: 70 IN

## 2018-04-20 DIAGNOSIS — C61 PROSTATE CA: ICD-10-CM

## 2018-04-20 DIAGNOSIS — G45.9 TIA DUE TO EMBOLISM: Primary | ICD-10-CM

## 2018-04-20 DIAGNOSIS — I74.9 TIA DUE TO EMBOLISM: Primary | ICD-10-CM

## 2018-04-20 DIAGNOSIS — G50.0 TRIGEMINAL NEURALGIA OF RIGHT SIDE OF FACE: ICD-10-CM

## 2018-04-20 LAB
ALBUMIN SERPL BCP-MCNC: 3.5 G/DL
ALP SERPL-CCNC: 85 U/L
ALT SERPL W/O P-5'-P-CCNC: 22 U/L
ANION GAP SERPL CALC-SCNC: 11 MMOL/L
AST SERPL-CCNC: 14 U/L
BASOPHILS # BLD AUTO: 0.04 K/UL
BASOPHILS NFR BLD: 0.7 %
BILIRUB SERPL-MCNC: 0.2 MG/DL
BUN SERPL-MCNC: 18 MG/DL
CALCIUM SERPL-MCNC: 9 MG/DL
CHLORIDE SERPL-SCNC: 100 MMOL/L
CO2 SERPL-SCNC: 24 MMOL/L
COMPLEXED PSA SERPL-MCNC: 1.8 NG/ML
CREAT SERPL-MCNC: 1.15 MG/DL
CRP SERPL-MCNC: 3 MG/DL
DIFFERENTIAL METHOD: ABNORMAL
EOSINOPHIL # BLD AUTO: 0.4 K/UL
EOSINOPHIL NFR BLD: 6.7 %
ERYTHROCYTE [DISTWIDTH] IN BLOOD BY AUTOMATED COUNT: 15 %
EST. GFR  (AFRICAN AMERICAN): >60 ML/MIN/1.73 M^2
EST. GFR  (NON AFRICAN AMERICAN): >60 ML/MIN/1.73 M^2
GLUCOSE SERPL-MCNC: 98 MG/DL
HCT VFR BLD AUTO: 26.6 %
HGB BLD-MCNC: 8.7 G/DL
LYMPHOCYTES # BLD AUTO: 0.6 K/UL
LYMPHOCYTES NFR BLD: 11.5 %
MCH RBC QN AUTO: 27.6 PG
MCHC RBC AUTO-ENTMCNC: 32.7 G/DL
MCV RBC AUTO: 84 FL
MONOCYTES # BLD AUTO: 0.5 K/UL
MONOCYTES NFR BLD: 8.7 %
NEUTROPHILS # BLD AUTO: 3.9 K/UL
NEUTROPHILS NFR BLD: 72.4 %
NRBC BLD-RTO: 0 /100 WBC
PLATELET # BLD AUTO: 268 K/UL
PMV BLD AUTO: 9.2 FL
POTASSIUM SERPL-SCNC: 4.4 MMOL/L
PROT SERPL-MCNC: 6.4 G/DL
RBC # BLD AUTO: 3.15 M/UL
SODIUM SERPL-SCNC: 135 MMOL/L
WBC # BLD AUTO: 5.41 K/UL

## 2018-04-20 PROCEDURE — 84165 PROTEIN E-PHORESIS SERUM: CPT

## 2018-04-20 PROCEDURE — 99999 PR PBB SHADOW E&M-EST. PATIENT-LVL IV: CPT | Mod: PBBFAC,,, | Performed by: PSYCHIATRY & NEUROLOGY

## 2018-04-20 PROCEDURE — 84165 PROTEIN E-PHORESIS SERUM: CPT | Mod: 26,,, | Performed by: PATHOLOGY

## 2018-04-20 PROCEDURE — 80053 COMPREHEN METABOLIC PANEL: CPT | Mod: PN

## 2018-04-20 PROCEDURE — 36415 COLL VENOUS BLD VENIPUNCTURE: CPT | Mod: PN

## 2018-04-20 PROCEDURE — 84153 ASSAY OF PSA TOTAL: CPT | Mod: PN

## 2018-04-20 PROCEDURE — 99214 OFFICE O/P EST MOD 30 MIN: CPT | Mod: S$PBB,,, | Performed by: PSYCHIATRY & NEUROLOGY

## 2018-04-20 PROCEDURE — 85025 COMPLETE CBC W/AUTO DIFF WBC: CPT | Mod: PN

## 2018-04-20 PROCEDURE — 86140 C-REACTIVE PROTEIN: CPT

## 2018-04-20 PROCEDURE — 80053 COMPREHEN METABOLIC PANEL: CPT

## 2018-04-20 PROCEDURE — 99214 OFFICE O/P EST MOD 30 MIN: CPT | Mod: PBBFAC,PO | Performed by: PSYCHIATRY & NEUROLOGY

## 2018-04-20 PROCEDURE — 85025 COMPLETE CBC W/AUTO DIFF WBC: CPT

## 2018-04-20 PROCEDURE — 86140 C-REACTIVE PROTEIN: CPT | Mod: PN

## 2018-04-20 PROCEDURE — 84153 ASSAY OF PSA TOTAL: CPT

## 2018-04-20 NOTE — PATIENT INSTRUCTIONS
PLAN:  -- hold aspirin 7 days prior to tube change, resume the following day if not bleeding from tubes, if bleeding, resume once bleeding has stopped  -- otherwise continue Aspirin 325mg daily  -- continue simvastatin 40mg daily   -- continue Trileptal (oxcarbazepime) 150mg (half pill) in AM and 300mg at night - OK to move to 300mg twice a day or 150/150/300mg as tolerated and if needed for pain

## 2018-04-20 NOTE — PROGRESS NOTES
"Date of service: 4/20/2018  Referring provider: No ref. provider found    Subjective:      Chief complaint: Medication Refill       Patient ID: Chester Quinones is a 73 y.o. 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 He does report the TN is much better though his wife says he cannot tolerate the full 300mg BID just yet.     Recently had event of hematuria from his nephrostomy tube - Right side had blood left side had pus. He was admitted this week after this for nephrostomy tube change and had to have ABX for UTI, and fluids for dehydration. He has been unstable since the hospital and using his cane voluntarily. He will now have the nephrostomy tube changed every 2 months instead of every 3 months. He is still not back to his physical baseline after this infection.     INTERVAL HISTORY - 3/22/18     Patient reports he is good but his wife reports his memory has been poor for the past 2-3 weeks, she is constantly having to repeat things to him. His trigeminal neuralgia (right) is back - nose, lip, eye. He does not describe this as a pain but as a "symptom, like a nerve, it is so minor." He fell 2 months ago, on the grass, hitting the left forehead, but no more recent head trauma. He was treated for a UTI one month ago, no recent fevers or chills or other systemic symptoms. " "    INTERVAL HISTORY - 12/22/17     I last saw patient in [facility] for consultation for stroke/TIA which I was concerned were due to cardioembolic source possible cancerous based on appearance. He had a negative ANTWAN in the hospital. He also had a EEG in the hospital because of recurrent episodes of confusion which was normal. I recommended Pradaxa for stroke prevention, this was deferred in the setting of bilateral nephrostomy tubes and suprapubic catheter which had previously been affected with bleeding.     He was discharged on full dose ASA 325mg, lisinopril 5mg and continued on simvastatin 40mg daily. His urologist feels he is very high risk for bleeding from the urological sources (has bled in the setting of NO thinners before).     Today they report she is doing better. His energy is better. His appetite has increased, gaining weight, walking without the assistance of walker. He is in PT and Ot. He has had no further TIAs. He is on a 30 day heart monitor - has has not had any events.     He is having nephrostomy tubes changes 1/4/17 and asks when to stop aspirin. He is having active hematuria in the suprapubic catheter which started in the hospital (he had this problem before aspirin as well).     ORIGINAL NEURO HISTORY - 12/3/17     Mr. Quinones has been feeling generally unwell for months, since his last episode of sepsis. He was undergoing an outpatient stress test with Dr. Molina on 11/30/17. On the drive back home, he had a 20 min episode of speaking "gibberish" and being very frustrated doing so. Even after his language disturbance resolved, his wife noticed he had a hard time walking. Later that day he had a headache. His wife wanted him to be checked out but he declined. On the morning of presentation, 12/1/17 he woke up at 5am. His wife said his gait was unsteady and it was unusual of him to request a wheelchair to the doctor's office. He went for a routine visit with his oncologist where he was noted to " "have slow speech and unsteady gait, and was recommended to go to the Ed. In the ED his vitals were 136/110, 72, afebrile and was found to have a left facial droop.      Mr. Quinones also reports having a "TIA" in feb 2017 during which he was confused for a couple of days but it does not seem that he had focal neurological findings. During this time he was on aspirin, and he had been on aspirin for years but in Aug 2017 stopped when he had bleeding from the right nephrostomy tube and had not gone back. He reports no other known neurological events. He feels tired.    UA was "dirty" but this is felt to be chronic.      HOSPITAL COURSE:     12/3/17: patient had ANTWAN today, awaiting results. His wife mentioned to me that during this hospitalization he has not seemed himself, specifically, having intermittent periods of confusion. She is very scared of brining him home.     Review of patient's allergies indicates:   Allergen Reactions    Niacin Rash    Ativan [lorazepam] Hallucinations     Current Outpatient Prescriptions   Medication Sig Dispense Refill    amoxicillin-clavulanate 875-125mg (AUGMENTIN) 875-125 mg per tablet Take 1 tablet by mouth 2 (two) times daily. 10 tablet 0    aspirin 325 MG tablet Take 1 tablet (325 mg total) by mouth once daily.  0    cyanocobalamin (VITAMIN B-12) 1000 MCG tablet Take 1,000 mcg by mouth once daily.      diazePAM (VALIUM) 10 MG Tab Take 1 tablet (10 mg total) by mouth every 12 (twelve) hours. Takes 5 mg am and 10 mg pm 60 tablet 3    enzalutamide 40 mg Cap Take 160 mg by mouth once daily. 120 capsule 6    FLUoxetine (PROZAC) 20 MG capsule Take 1 capsule (20 mg total) by mouth once daily. 30 capsule 6    folic acid (FOLVITE) 400 MCG tablet Take 400 mcg by mouth once daily.      gabapentin (NEURONTIN) 600 MG tablet Take 600 mg by mouth 3 (three) times daily. Use prior to surgery      leuprolide, 6 month, (ELIGARD) 45 mg (6 month) injection Inject 45 mg into the skin every 3 " (three) months. Due February 12, 2018      lisinopril (PRINIVIL,ZESTRIL) 5 MG tablet Take 5 mg by mouth once daily.      omeprazole (PRILOSEC) 40 MG capsule Take 40 mg by mouth once daily. Use prior to surgery      OXcarbazepine (TRILEPTAL) 300 MG Tab Take 1 tablet (300 mg total) by mouth 2 (two) times daily. 60 tablet 3    oxybutynin (DITROPAN) 5 MG Tab Take 5 mg by mouth 2 (two) times daily. Use prior to surgery      simvastatin (ZOCOR) 20 MG tablet Take 2 tablets (40 mg total) by mouth every evening. Use another dose in AM for surgery 60 tablet 0    temazepam (RESTORIL) 15 mg Cap Take 1 capsule (15 mg total) by mouth nightly as needed. 30 capsule 3     No current facility-administered medications for this visit.        Past Medical History  Past Medical History:   Diagnosis Date    Anemia     wife stated he bleeds from right kidney    Arthritis     Asbestosis     Cataract     Colorectal cancer 2015    COPD (chronic obstructive pulmonary disease)     Coronary artery disease     stent x 1    Encounter for blood transfusion     History of suprapubic catheter     Hyperlipidemia     Hypertension     Incontinence     Kidney insufficiency     Myocardial infarction     Neuropathy     Prostate cancer 2005    and in 2010    Radiation injury     after prostate cancer treatment    Restless leg     Septic shock     Stroke     12/1/17    TIA (transient ischemic attack) 2016    Trigeminal neuralgia     to face & neck       Past Surgical History  Past Surgical History:   Procedure Laterality Date    APPENDECTOMY      COLON SURGERY  11/17/2016    colon resection, ileostomy    CORONARY STENT PLACEMENT  1997    EYE SURGERY      bilat cataract, detached retina bilat    Incision and drainage right buttock abscess Right 10/29/15    NEPHROSTOMY Bilateral     super pubic catheter      TONSILLECTOMY      Age 1    VASECTOMY      vasectomy reversed         Family History  Family History   Problem  Relation Age of Onset    Alzheimer's disease Mother     Mental illness Mother     Diabetes Mother     Hypertension Mother     Hearing loss Mother     Alzheimer's disease Father     Hearing loss Father     Arthritis Father        Social History  Social History     Social History    Marital status:      Spouse name: N/A    Number of children: N/A    Years of education: N/A     Occupational History    Not on file.     Social History Main Topics    Smoking status: Former Smoker     Packs/day: 1.00     Years: 40.00     Quit date: 1/1/2004    Smokeless tobacco: Never Used    Alcohol use No    Drug use: No    Sexual activity: Yes     Partners: Female     Other Topics Concern    Not on file     Social History Narrative    No narrative on file        Review of Systems  Constitutional: no fever, no chills  Eyes: no change to vision, no redness, no tearing  Ears, nose, mouth, throat: no hearing loss, + tinnitus, no rhinorrhea, no difficulty swallowing   Cardiovascular: no palpitations  Respiratory: no shortness of breath +cough/wheezing   Gastrointestinal: no diarrhea, no constipation  Gu: + hematuria (chronic)   Musculoskeletal: + joint pain   Skin: no rashes  Neurologic: + numbness, weakness, change to speech, loss of coordination   Endocrine: no heat or cold intolerance     Objective:        Vitals:    04/20/18 1113   BP: 132/69   Pulse: 65   Resp: 16     Body mass index is 28.7 kg/m².    PHYSICAL EXAM:  Patient appears chronically ill, pale, tired  Using cane      PREVIOUS:   NEUROLOGICAL EXAM:  Mental status: Awake, alert, and oriented to person, place, and time. Recent and remote memory appear to be intact. MOCA 27/30 (normal >26)  Speech/Language: No aphasia on conversation. Intermittently, speech is slightly slurred. Cranial nerves: Visual fields of right eye full, left eye legally blind. Pupils equal round and reactive to light, extraocular movements intact, facial strength intact  bilaterally, facial sensation V1-V3 intact bilaterally, palate symmetric, tongue midline, hearing grossly intact bilaterally.  Motor: no pronator drift. No orbit. 5 out of 5 strength throughout the upper and lower extremities bilaterally. Normal bulk and tone. No tremor. No asterixis.   Sensation: Intact to light touch bilaterally.  DTR: 1+ at the knees and biceps bilaterally.  Coordination: Finger-nose-finger testing normal bilaterally.  Gait: normal          Data Review:     I have personally reviewed the referring provider's notes, labs, & imaging made available to me today.     12/4/17 EEG spot - normal     12/2/17 CTA head / neck - normal     Results for orders placed or performed during the hospital encounter of 12/01/17   MRI Brain Without Contrast    Narrative    Indication: Neurologic deficit, slurred speech, altered mental status.    Procedure:    MRI brain without contrast.    Comparison examination: Noncontrast CT of the brain: 12/1/2017    Technique:    Routine fat and water-weighted sequences were obtained.    Findings:    Limited study secondary to motion artifact.  There is advanced generalized atrophy both centrally and over the convexities without midline shift or mass effect.  Areas of increased T2 and flair weighted signal change about the ventricular white matter, basal ganglia as well as the brainstem are consistent with areas of remote ischemia and/or demyelinization.    The diffusion sequence demonstrates a small, 5 mm linear focus of diffusion restriction at the level of the left occipital lobe, image 72 series 8.  This is increased in signal on the flair weighted sequence and may simply represent artifactual change, a tiny focal area of subacute infarct would be in the differential.    Normal signal flow-voids present about the skull base.    No acute hemorrhage on the T1 sequence.    Impression    1.  Small focal area of increased diffusion restriction which, although may be artifactual,  likely represents an area of subacute infarction within the left occipital lobe but without underlying parenchymal hemorrhage, or midline shift.    2.  Remote ischemic changes with advanced generalized atrophy.      Electronically signed by: MADELINE DHILLON MD  Date:     12/02/17  Time:    11:17    CT Head Without Contrast    Narrative    CT HEAD WITHOUT CONTRAST: 12/01/2017 at 1336    HISTORY: Clinical history is slurred speech, history of colorectal and prostate cancer.  No history of recent trauma or recent surgery is provided.    Comparison: CT head 02/02/2017.    TECHNIQUE: Multiple contiguous axial images were acquired from the base of the skull and the vertex without contrast administration. Total DLP for the study is approximately 586 mGy-cm. Automatic exposure control was utilized.    FINDINGS:No mass effect, hemorrhage or hydrocephalus is appreciated. Symmetric appearance of the soft tissues, orbits. No significant mucosal thickening or paranasal sinus fluid levels are appreciated. No displaced fracture or dislocation is appreciated. Motion, streak artifact is present. Mild volume loss and chronic appearing periventricular white matter changes are appreciated.  There are dense choroid calcifications as well as dural calcification similar overall with mild atherosclerotic vascular calcifications and partially empty sella variant.  There are some minimal central lacunar changes present similar also. Correlate overall with the patient's clinical findings. No significant interval changes are appreciated.    Impression     No interval mass effect, displaced fracture or hemorrhage is appreciated.  No significant interval intracranial changes are appreciated.        Electronically signed by: CAROL NERI MD  Date:     12/01/17  Time:    13:43    Results for orders placed or performed during the hospital encounter of 02/02/17   CT Head W Wo Contrast    Narrative    CT HEAD WITH AND WITHOUT  CONTRAST:    HISTORY:  Frequent falls, confusion, prostate cancer with bone metastasis.    TECHNIQUE: Multiple contiguous axial images were acquired from the base of the skull to the vertex before and following intravenous contrast administration.  Coronal and sagittal reformations were obtained.  Total DLP for the study is approximately 1243 mGy-cm.  Automated exposure control was utilized to reduce radiation dose.      COMPARISON: None available    FINDINGS:    There is diffuse parenchymal atrophy..  There is patchy periventricular and deep white matter hypoattenuation compatible with chronic micro-ischemia.  There is intracranial atherosclerosis.  No evidence of acute intracranial hemorrhage, mass effect, midline deviation, hydrocephalus, or abnormal extra-axial fluid collection is seen. No evidence of acute large vessel territory ischemia is seen. The basilar cisterns are preserved.  No pathologic enhancement is identified.  The visualized paranasal sinuses are clear.  The mastoid air cells are grossly clear.  No acute displaced calvarial abnormality is appreciated.    Impression    1. No acute intracranial process is identified.  2.  No pathologic enhancement identified.      Electronically signed by: CAS ALMANZA MD  Date:     02/02/17  Time:    12:27        Lab Results   Component Value Date     (L) 04/20/2018    K 4.4 04/20/2018    MG 1.9 12/16/2016     04/20/2018    CO2 24 04/20/2018    BUN 18 04/20/2018    CREATININE 1.15 04/20/2018    GLU 98 04/20/2018    HGBA1C 5.1 12/01/2017    AST 14 (L) 04/20/2018    AST 32 04/27/2016    ALT 22 04/20/2018    ALBUMIN 3.5 04/20/2018    PROT 6.4 04/20/2018    BILITOT 0.2 04/20/2018    CHOL 154 12/01/2017    HDL 27 (L) 12/01/2017    LDLCALC 83.0 12/01/2017    TRIG 220 (H) 12/01/2017       Lab Results   Component Value Date    WBC 5.41 04/20/2018    HGB 8.7 (L) 04/20/2018    HCT 26.6 (L) 04/20/2018    MCV 84 04/20/2018     04/20/2018       Lab Results    Component Value Date    TSH 2.170 02/23/2018       Assessment & Plan:       Problem List Items Addressed This Visit        Neuro    TIA due to embolism - Primary    Overview     Mr. Quinones had two neurological events  in time and space. He is right handed. On 11/30/17 he had a 20 min episode of aphasia and on 12/1/17 he had a witnessed left facial droop which is now resolved. He has no acute stroke on imaging to account for each of these events, and these events represent separate vascular distributions.     Based on different vascular distributions and old strokes I have a very high suspicion for cardioembolic source. He does have metastatic prostate cancer but his ddimer was negative implying low likelihood of malignancy related stroke. His ANTWAN was negative for clot or other source of stroke.     Hospital workup did not reveal a source. I think he likely has very occult atrial fibrillation. Collectively decided against pradaxa with his team do to problems with hematuria from the bladder and kidneys due to the suprapubic cath and nephrostomy tube. I support this.     When he is not having his tubes changed he needs to be adherent to a full dose Asa regimen. His cholesterol is under good control.     Continue current treatment.          Trigeminal neuralgia of right side of face    Overview     Taken off tegretol due to medication interactions with his prostate cancer regimen. Trileptal without these interacitons and low dose has been helpful. May use additional half tab (150mg) PRN in addition to his current 150/300 regimen.                  .     PLAN:  -- hold aspirin 7 days prior to tube change, resume the following day if not bleeding from tubes, if bleeding, resume once bleeding has stopped  -- otherwise continue Aspirin 325mg daily  -- continue simvastatin 40mg daily   -- continue Trileptal (oxcarbazepime) 150mg (half pill) in AM and 300mg at night - OK to move to 300mg twice a day or 150/150/300mg  as tolerated and if needed for pain       Follow-up in about 3 months (around 7/20/2018).      Jessica Bernardo MD

## 2018-04-23 LAB
ALBUMIN SERPL ELPH-MCNC: 3.04 G/DL
ALPHA1 GLOB SERPL ELPH-MCNC: 0.49 G/DL
ALPHA2 GLOB SERPL ELPH-MCNC: 1.07 G/DL
B-GLOBULIN SERPL ELPH-MCNC: 0.7 G/DL
GAMMA GLOB SERPL ELPH-MCNC: 0.7 G/DL
PATHOLOGIST INTERPRETATION SPE: NORMAL
PROT SERPL-MCNC: 6 G/DL

## 2018-05-30 ENCOUNTER — LAB VISIT (OUTPATIENT)
Dept: LAB | Facility: HOSPITAL | Age: 73
End: 2018-05-30
Attending: INTERNAL MEDICINE
Payer: MEDICARE

## 2018-05-30 DIAGNOSIS — C20 MALIGNANT TUMOR OF RECTUM: ICD-10-CM

## 2018-05-30 DIAGNOSIS — C61 PROSTATE CA: ICD-10-CM

## 2018-05-30 LAB
ALBUMIN SERPL BCP-MCNC: 3.6 G/DL
ALP SERPL-CCNC: 72 U/L
ALT SERPL W/O P-5'-P-CCNC: 28 U/L
ANION GAP SERPL CALC-SCNC: 11 MMOL/L
AST SERPL-CCNC: 25 U/L
BASOPHILS # BLD AUTO: 0.03 K/UL
BASOPHILS NFR BLD: 0.5 %
BILIRUB SERPL-MCNC: 0.3 MG/DL
BUN SERPL-MCNC: 17 MG/DL
CALCIUM SERPL-MCNC: 8.8 MG/DL
CEA SERPL-MCNC: 1.1 NG/ML
CHLORIDE SERPL-SCNC: 98 MMOL/L
CO2 SERPL-SCNC: 25 MMOL/L
COMPLEXED PSA SERPL-MCNC: 1.4 NG/ML
CREAT SERPL-MCNC: 1.22 MG/DL
DIFFERENTIAL METHOD: ABNORMAL
EOSINOPHIL # BLD AUTO: 0.3 K/UL
EOSINOPHIL NFR BLD: 5 %
ERYTHROCYTE [DISTWIDTH] IN BLOOD BY AUTOMATED COUNT: 15.3 %
EST. GFR  (AFRICAN AMERICAN): >60 ML/MIN/1.73 M^2
EST. GFR  (NON AFRICAN AMERICAN): 58 ML/MIN/1.73 M^2
GLUCOSE SERPL-MCNC: 104 MG/DL
HCT VFR BLD AUTO: 26.1 %
HGB BLD-MCNC: 8.3 G/DL
LYMPHOCYTES # BLD AUTO: 0.6 K/UL
LYMPHOCYTES NFR BLD: 10.8 %
MCH RBC QN AUTO: 25.5 PG
MCHC RBC AUTO-ENTMCNC: 31.8 G/DL
MCV RBC AUTO: 80 FL
MONOCYTES # BLD AUTO: 0.6 K/UL
MONOCYTES NFR BLD: 10.4 %
NEUTROPHILS # BLD AUTO: 4.3 K/UL
NEUTROPHILS NFR BLD: 73.3 %
NRBC BLD-RTO: 0 /100 WBC
PLATELET # BLD AUTO: 249 K/UL
PMV BLD AUTO: 8.9 FL
POTASSIUM SERPL-SCNC: 4.1 MMOL/L
PROT SERPL-MCNC: 6.2 G/DL
RBC # BLD AUTO: 3.25 M/UL
SODIUM SERPL-SCNC: 134 MMOL/L
WBC # BLD AUTO: 5.84 K/UL

## 2018-05-30 PROCEDURE — 36415 COLL VENOUS BLD VENIPUNCTURE: CPT | Mod: PN

## 2018-05-30 PROCEDURE — 84153 ASSAY OF PSA TOTAL: CPT | Mod: PN

## 2018-05-30 PROCEDURE — 80053 COMPREHEN METABOLIC PANEL: CPT | Mod: PN

## 2018-05-30 PROCEDURE — 84165 PROTEIN E-PHORESIS SERUM: CPT | Mod: 26,,, | Performed by: PATHOLOGY

## 2018-05-30 PROCEDURE — 85025 COMPLETE CBC W/AUTO DIFF WBC: CPT | Mod: PN

## 2018-05-30 PROCEDURE — 84165 PROTEIN E-PHORESIS SERUM: CPT

## 2018-05-30 PROCEDURE — 82378 CARCINOEMBRYONIC ANTIGEN: CPT

## 2018-05-30 PROCEDURE — 84153 ASSAY OF PSA TOTAL: CPT

## 2018-05-30 PROCEDURE — 85025 COMPLETE CBC W/AUTO DIFF WBC: CPT

## 2018-05-30 PROCEDURE — 82378 CARCINOEMBRYONIC ANTIGEN: CPT | Mod: PN

## 2018-05-30 PROCEDURE — 80053 COMPREHEN METABOLIC PANEL: CPT

## 2018-05-31 LAB
ALBUMIN SERPL ELPH-MCNC: 3.36 G/DL
ALPHA1 GLOB SERPL ELPH-MCNC: 0.38 G/DL
ALPHA2 GLOB SERPL ELPH-MCNC: 0.95 G/DL
B-GLOBULIN SERPL ELPH-MCNC: 0.64 G/DL
GAMMA GLOB SERPL ELPH-MCNC: 0.68 G/DL
PATHOLOGIST INTERPRETATION SPE: NORMAL
PROT SERPL-MCNC: 6 G/DL

## 2018-06-08 PROBLEM — D63.8 ANEMIA, CHRONIC DISEASE: Status: ACTIVE | Noted: 2018-06-08

## 2018-06-14 ENCOUNTER — INFUSION (OUTPATIENT)
Dept: INFUSION THERAPY | Facility: HOSPITAL | Age: 73
End: 2018-06-14
Attending: INTERNAL MEDICINE
Payer: MEDICARE

## 2018-06-14 VITALS
SYSTOLIC BLOOD PRESSURE: 138 MMHG | RESPIRATION RATE: 20 BRPM | TEMPERATURE: 98 F | HEART RATE: 74 BPM | DIASTOLIC BLOOD PRESSURE: 63 MMHG

## 2018-06-14 DIAGNOSIS — D63.8 ANEMIA, CHRONIC DISEASE: Primary | ICD-10-CM

## 2018-06-14 DIAGNOSIS — C61 PROSTATE CA: ICD-10-CM

## 2018-06-14 PROCEDURE — 63600175 PHARM REV CODE 636 W HCPCS: Mod: TB,PN,EA | Performed by: PHYSICIAN ASSISTANT

## 2018-06-14 PROCEDURE — 96372 THER/PROPH/DIAG INJ SC/IM: CPT | Mod: PN

## 2018-06-14 RX ADMIN — ERYTHROPOIETIN 40000 UNITS: 40000 INJECTION, SOLUTION INTRAVENOUS; SUBCUTANEOUS at 01:06

## 2018-06-21 ENCOUNTER — INFUSION (OUTPATIENT)
Dept: INFUSION THERAPY | Facility: HOSPITAL | Age: 73
End: 2018-06-21
Attending: INTERNAL MEDICINE
Payer: MEDICARE

## 2018-06-21 VITALS
HEIGHT: 70 IN | DIASTOLIC BLOOD PRESSURE: 56 MMHG | WEIGHT: 211.69 LBS | TEMPERATURE: 98 F | BODY MASS INDEX: 30.31 KG/M2 | RESPIRATION RATE: 18 BRPM | OXYGEN SATURATION: 99 % | HEART RATE: 75 BPM | SYSTOLIC BLOOD PRESSURE: 121 MMHG

## 2018-06-21 DIAGNOSIS — C61 PROSTATE CA: ICD-10-CM

## 2018-06-21 DIAGNOSIS — D63.8 ANEMIA, CHRONIC DISEASE: Primary | ICD-10-CM

## 2018-06-21 PROCEDURE — 96372 THER/PROPH/DIAG INJ SC/IM: CPT | Mod: PN

## 2018-06-21 PROCEDURE — 63600175 PHARM REV CODE 636 W HCPCS: Mod: TB,PN | Performed by: PHYSICIAN ASSISTANT

## 2018-06-21 RX ADMIN — ERYTHROPOIETIN 40000 UNITS: 40000 INJECTION, SOLUTION INTRAVENOUS; SUBCUTANEOUS at 11:06

## 2018-06-28 ENCOUNTER — INFUSION (OUTPATIENT)
Dept: INFUSION THERAPY | Facility: HOSPITAL | Age: 73
End: 2018-06-28
Attending: INTERNAL MEDICINE
Payer: MEDICARE

## 2018-06-28 VITALS
TEMPERATURE: 99 F | HEART RATE: 78 BPM | RESPIRATION RATE: 18 BRPM | DIASTOLIC BLOOD PRESSURE: 56 MMHG | SYSTOLIC BLOOD PRESSURE: 125 MMHG

## 2018-06-28 DIAGNOSIS — D63.8 ANEMIA, CHRONIC DISEASE: Primary | ICD-10-CM

## 2018-06-28 DIAGNOSIS — C61 PROSTATE CA: ICD-10-CM

## 2018-06-28 PROCEDURE — 96372 THER/PROPH/DIAG INJ SC/IM: CPT | Mod: PN

## 2018-06-28 PROCEDURE — 63600175 PHARM REV CODE 636 W HCPCS: Mod: TB,PN,EA | Performed by: PHYSICIAN ASSISTANT

## 2018-06-28 RX ADMIN — ERYTHROPOIETIN 40000 UNITS: 40000 INJECTION, SOLUTION INTRAVENOUS; SUBCUTANEOUS at 01:06

## 2018-07-06 ENCOUNTER — INFUSION (OUTPATIENT)
Dept: INFUSION THERAPY | Facility: HOSPITAL | Age: 73
End: 2018-07-06
Attending: INTERNAL MEDICINE
Payer: MEDICARE

## 2018-07-06 VITALS
HEIGHT: 71 IN | BODY MASS INDEX: 28.56 KG/M2 | RESPIRATION RATE: 16 BRPM | TEMPERATURE: 98 F | HEART RATE: 79 BPM | SYSTOLIC BLOOD PRESSURE: 144 MMHG | WEIGHT: 204 LBS | DIASTOLIC BLOOD PRESSURE: 66 MMHG

## 2018-07-06 DIAGNOSIS — D63.8 ANEMIA, CHRONIC DISEASE: Primary | ICD-10-CM

## 2018-07-06 DIAGNOSIS — C61 PROSTATE CA: ICD-10-CM

## 2018-07-06 PROCEDURE — 96372 THER/PROPH/DIAG INJ SC/IM: CPT | Mod: PN

## 2018-07-06 PROCEDURE — 63600175 PHARM REV CODE 636 W HCPCS: Mod: TB,PN | Performed by: PHYSICIAN ASSISTANT

## 2018-07-06 RX ADMIN — ERYTHROPOIETIN 40000 UNITS: 40000 INJECTION, SOLUTION INTRAVENOUS; SUBCUTANEOUS at 11:07

## 2018-07-06 NOTE — PLAN OF CARE
Problem: Patient Care Overview  Goal: Plan of Care Review  Outcome: Ongoing (interventions implemented as appropriate)  Pt tolerated procrit injection well

## 2018-07-10 PROBLEM — R33.9 CHRONIC RETENTION OF URINE: Status: ACTIVE | Noted: 2018-07-10

## 2018-07-20 ENCOUNTER — INFUSION (OUTPATIENT)
Dept: INFUSION THERAPY | Facility: HOSPITAL | Age: 73
End: 2018-07-20
Attending: INTERNAL MEDICINE
Payer: MEDICARE

## 2018-07-20 VITALS
TEMPERATURE: 98 F | RESPIRATION RATE: 20 BRPM | HEART RATE: 69 BPM | DIASTOLIC BLOOD PRESSURE: 69 MMHG | SYSTOLIC BLOOD PRESSURE: 127 MMHG

## 2018-07-20 DIAGNOSIS — D63.8 ANEMIA, CHRONIC DISEASE: Primary | ICD-10-CM

## 2018-07-20 DIAGNOSIS — C61 PROSTATE CA: ICD-10-CM

## 2018-07-20 PROCEDURE — 96372 THER/PROPH/DIAG INJ SC/IM: CPT | Mod: PN

## 2018-07-20 PROCEDURE — 63600175 PHARM REV CODE 636 W HCPCS: Mod: TB,EA,PN | Performed by: PHYSICIAN ASSISTANT

## 2018-07-20 RX ADMIN — ERYTHROPOIETIN 40000 UNITS: 40000 INJECTION, SOLUTION INTRAVENOUS; SUBCUTANEOUS at 09:07

## 2018-07-20 NOTE — NURSING
0900=pt present to infusion with blood observed to be in right nephroctomy tube/bag. States it happens sometimes. Pt very fatigued/weak. Pt instructed to go to/let nephrologist know about the blood. Family member at chairside aware and states that they will go.

## 2018-07-27 ENCOUNTER — INFUSION (OUTPATIENT)
Dept: INFUSION THERAPY | Facility: HOSPITAL | Age: 73
End: 2018-07-27
Attending: INTERNAL MEDICINE
Payer: MEDICARE

## 2018-07-27 VITALS
SYSTOLIC BLOOD PRESSURE: 147 MMHG | BODY MASS INDEX: 29.29 KG/M2 | HEIGHT: 71 IN | TEMPERATURE: 98 F | DIASTOLIC BLOOD PRESSURE: 66 MMHG | RESPIRATION RATE: 16 BRPM | OXYGEN SATURATION: 99 % | HEART RATE: 73 BPM | WEIGHT: 209.19 LBS

## 2018-07-27 DIAGNOSIS — D63.8 ANEMIA, CHRONIC DISEASE: Primary | ICD-10-CM

## 2018-07-27 DIAGNOSIS — C61 PROSTATE CA: ICD-10-CM

## 2018-07-27 PROCEDURE — 63600175 PHARM REV CODE 636 W HCPCS: Mod: TB,EA,PN | Performed by: PHYSICIAN ASSISTANT

## 2018-07-27 PROCEDURE — 96372 THER/PROPH/DIAG INJ SC/IM: CPT | Mod: PN

## 2018-07-27 RX ADMIN — ERYTHROPOIETIN 40000 UNITS: 40000 INJECTION, SOLUTION INTRAVENOUS; SUBCUTANEOUS at 10:07

## 2018-08-03 ENCOUNTER — INFUSION (OUTPATIENT)
Dept: INFUSION THERAPY | Facility: HOSPITAL | Age: 73
End: 2018-08-03
Attending: INTERNAL MEDICINE
Payer: MEDICARE

## 2018-08-03 VITALS
TEMPERATURE: 98 F | HEIGHT: 71 IN | WEIGHT: 209.81 LBS | SYSTOLIC BLOOD PRESSURE: 123 MMHG | DIASTOLIC BLOOD PRESSURE: 60 MMHG | BODY MASS INDEX: 29.37 KG/M2 | HEART RATE: 74 BPM | RESPIRATION RATE: 16 BRPM | OXYGEN SATURATION: 99 %

## 2018-08-03 DIAGNOSIS — D63.8 ANEMIA, CHRONIC DISEASE: Primary | ICD-10-CM

## 2018-08-03 DIAGNOSIS — C61 PROSTATE CA: ICD-10-CM

## 2018-08-03 PROCEDURE — 63600175 PHARM REV CODE 636 W HCPCS: Mod: TB,EC,PN | Performed by: PHYSICIAN ASSISTANT

## 2018-08-03 PROCEDURE — 96372 THER/PROPH/DIAG INJ SC/IM: CPT | Mod: PN

## 2018-08-03 RX ADMIN — ERYTHROPOIETIN 40000 UNITS: 40000 INJECTION, SOLUTION INTRAVENOUS; SUBCUTANEOUS at 11:08

## 2018-08-03 NOTE — PLAN OF CARE
Problem: Patient Care Overview  Goal: Discharge Needs Assessment  Outcome: Ongoing (interventions implemented as appropriate)  Tolerated injection without any distress.  Reviewed upcoming appointments.  Amb off unit independently by self.

## 2018-08-08 ENCOUNTER — TELEPHONE (OUTPATIENT)
Dept: INFUSION THERAPY | Facility: HOSPITAL | Age: 73
End: 2018-08-08

## 2018-08-08 ENCOUNTER — INFUSION (OUTPATIENT)
Dept: INFUSION THERAPY | Facility: HOSPITAL | Age: 73
End: 2018-08-08
Attending: INTERNAL MEDICINE
Payer: MEDICARE

## 2018-08-08 VITALS
SYSTOLIC BLOOD PRESSURE: 157 MMHG | WEIGHT: 205.31 LBS | BODY MASS INDEX: 29.39 KG/M2 | TEMPERATURE: 97 F | RESPIRATION RATE: 18 BRPM | HEIGHT: 70 IN | HEART RATE: 72 BPM | DIASTOLIC BLOOD PRESSURE: 67 MMHG

## 2018-08-08 DIAGNOSIS — D63.8 ANEMIA, CHRONIC DISEASE: Primary | ICD-10-CM

## 2018-08-08 DIAGNOSIS — C61 PROSTATE CA: ICD-10-CM

## 2018-08-08 PROCEDURE — 96372 THER/PROPH/DIAG INJ SC/IM: CPT | Mod: PN

## 2018-08-08 PROCEDURE — 63600175 PHARM REV CODE 636 W HCPCS: Mod: TB,PN | Performed by: PHYSICIAN ASSISTANT

## 2018-08-08 RX ORDER — SODIUM CHLORIDE 0.9 % (FLUSH) 0.9 %
10 SYRINGE (ML) INJECTION
Status: CANCELLED | OUTPATIENT
Start: 2018-08-08

## 2018-08-08 RX ORDER — HEPARIN 100 UNIT/ML
500 SYRINGE INTRAVENOUS
Status: CANCELLED | OUTPATIENT
Start: 2018-08-08

## 2018-08-08 RX ADMIN — ERYTHROPOIETIN 40000 UNITS: 40000 INJECTION, SOLUTION INTRAVENOUS; SUBCUTANEOUS at 10:08

## 2018-08-08 NOTE — TELEPHONE ENCOUNTER
[8/8/2018 10:20 AM] Sheryl Penny:   Mariano Quinones MRN [id number] needs feraheme hes already getting procrit here so Dr. White wants him to get the feraheme here 2 please  he wants 2 doses one week apart   [8/8/2018 10:39 AM] Nolvia Russo:   I put him on 08/15/18@1:30 and 08/22/18 @1:30 due to we don't have auth yet mariano quinones

## 2018-08-08 NOTE — PATIENT INSTRUCTIONS
"  Preventing Falls: Are You At Risk of Falling?     Ask for help to reduce risk of falling in your home.     As you get older, you're not as steady on your feet as you once were. And you may have health problems you didn't have when you were younger. So, it's not surprising that older people are more likely to trip and fall. Falling can be very serious. It can change your overall health and quality of life. That's why it's important to be aware of your own risk of falling.  The dangers of falling  Falls are one of the main causes of injury in people over age 65. An older person who falls may take longer to get better than a younger person. And, after a fall, an older person is more likely to have problems that don't go away. So, preventing falls can help you avoid serious health problems.  Are you at risk of falling?  Answer these questions to rate your level of risk.  · Are you a woman?  · Have you fallen or stumbled in the last year?  · Are you over age 65?  · Are you ever dizzy or lightheaded with standing?  · Do you have a hard time getting in and out of the bathtub or on and off the toilet?  · Do you lean on objects to help you get around? Or do you use a cane or walker?  · Do you have vision or hearing problems? For example, do you need new glasses or hearing aids?  · Do you have 2 or more long-lasting (chronic) medical conditions?  · Do you take 3 or more medicines?  · Have you felt depressed recently?  · Have you had more trouble with your memory in recent months?  · Are there hazards in your home that might cause you to fall, such as loose rugs or poor lighting?  · Do you have a pet that jumps on you or might trip you?  · Have you stopped getting regular exercise?  · Do you have diabetes?   · Do you have a neurologic disease, such as Parkinson or Alzheimer disease?   · Do you drink alcohol?  · Do you wear athletic shoes or slippers, or go barefoot at home?  You can help prevent falls  If you answered "yes" " to any of the above questions, you should take steps to reduce your risk of a fall. Monitoring health conditions and keeping walkways in your home free of clutter are just 2 ways. Changing is sometimes easier said than done. But keep in mind that even small changes can make you less likely to fall.  The fear of falling  It's normal to be scared of falling, especially if you've fallen before. But being afraid can actually make you more likely to fall. This is because:  · Fear might cause you to become less active. Being less active can lead to a loss of strength and balance.  · Fear can lead to isolation from others, depression, or the use of more medicines or alcohol. And all these things make falling even more likely.  To break the cycle, learn more about ways to avoid falling. As you take control, you may find yourself feeling less afraid.   Date Last Reviewed: 6/12/2015  © 2809-8439 [employer] [address] [place] [address]. All rights reserved. This information is not intended as a substitute for professional medical care. Always follow your healthcare professional's instructions.        Epoetin Cheng injection  What is this medicine?  EPOETIN CHENG (e ROB e tin AL fa) helps your body make more red blood cells. This medicine is used to treat anemia caused by chronic kidney failure, cancer chemotherapy, or HIV-therapy. It may also be used before surgery if you have anemia.  How should I use this medicine?  This medicine is for injection into a vein or under the skin. It is usually given by a health care professional in a hospital or clinic setting.  If you get this medicine at home, you will be taught how to prepare and give this medicine. Use exactly as directed. Take your medicine at regular intervals. Do not take your medicine more often than directed.  It is important that you put your used needles and syringes in a special sharps container. Do not put them in a trash can. If you do  not have a sharps container, call your pharmacist or healthcare provider to get one.  Talk to your pediatrician regarding the use of this medicine in children. While this drug may be prescribed for selected conditions, precautions do apply.  What side effects may I notice from receiving this medicine?  Side effects that you should report to your doctor or health care professional as soon as possible:  · allergic reactions like skin rash, itching or hives, swelling of the face, lips, or tongue  · breathing problems  · changes in vision  · chest pain  · confusion, trouble speaking or understanding  · feeling faint or lightheaded, falls  · high blood pressure  · muscle aches or pains  · pain, swelling, warmth in the leg  · rapid weight gain  · severe headaches  · sudden numbness or weakness of the face, arm or leg  · trouble walking, dizziness, loss of balance or coordination  · seizures (convulsions)  · swelling of the ankles, feet, hands  · unusually weak or tired  Side effects that usually do not require medical attention (report to your doctor or health care professional if they continue or are bothersome):  · diarrhea  · fever, chills (flu-like symptoms)  · headaches  · nausea, vomiting  · redness, stinging, or swelling at site where injected  What may interact with this medicine?  Do not take this medicine with any of the following medications:  · darbepoetin regulo  What if I miss a dose?  If you miss a dose, take it as soon as you can. If it is almost time for your next dose, take only that dose. Do not take double or extra doses.  Where should I keep my medicine?  Keep out of the reach of children.  Store in a refrigerator between 2 and 8 degrees C (36 and 46 degrees F). Do not freeze or shake. Throw away any unused portion if using a single-dose vial. Multi-dose vials can be kept in the refrigerator for up to 21 days after the initial dose. Throw away unused medicine.  What should I tell my health care provider  before I take this medicine?  They need to know if you have any of these conditions:  · blood clotting disorders  · cancer patient not on chemotherapy  · cystic fibrosis  · heart disease, such as angina or heart failure  · hemoglobin level of 12 g/dL or greater  · high blood pressure  · low levels of folate, iron, or vitamin B12  · seizures  · an unusual or allergic reaction to erythropoietin, albumin, benzyl alcohol, hamster proteins, other medicines, foods, dyes, or preservatives  · pregnant or trying to get pregnant  · breast-feeding  What should I watch for while using this medicine?  Visit your prescriber or health care professional for regular checks on your progress and for the needed blood tests and blood pressure measurements. It is especially important for the doctor to make sure your hemoglobin level is in the desired range, to limit the risk of potential side effects and to give you the best benefit. Keep all appointments for any recommended tests. Check your blood pressure as directed. Ask your doctor what your blood pressure should be and when you should contact him or her.  As your body makes more red blood cells, you may need to take iron, folic acid, or vitamin B supplements. Ask your doctor or health care provider which products are right for you. If you have kidney disease continue dietary restrictions, even though this medication can make you feel better. Talk with your doctor or health care professional about the foods you eat and the vitamins that you take.  NOTE:This sheet is a summary. It may not cover all possible information. If you have questions about this medicine, talk to your doctor, pharmacist, or health care provider. Copyright© 2017 [employer]        Understanding Anemia (During Chemotherapy)    Chemotherapy can reduce the number of red blood cells in your body. When you have too few of these cells, anemia can result. Anemia has many symptoms. Talk to your health care provider if  you have any of the signs listed here. You may need treatment.  What is anemia?  Anemia occurs when your blood does not have enough red cells in it. So, anemic blood cant carry as much oxygen as normal blood. As a result, your body is running on too little fuel. Red blood cells make up 36% to 40% of normal blood. If you have anemia, your red cell count (hematocrit) is below normal.   Signs to watch for  · Trouble concentrating  · Ongoing fatigue  · Shortness of breath  · Rapid heartbeat  · Impotence  · Feeling dizzy or lightheaded  · Constant feeling of being cold  Date Last Reviewed: 12/27/2015  © 7486-8924 "[employer]. [address] [place] [address], [place] [address]. All rights reserved. This information is not intended as a substitute for professional medical care. Always follow your healthcare professional's instructions.

## 2018-08-09 ENCOUNTER — OFFICE VISIT (OUTPATIENT)
Dept: NEUROLOGY | Facility: CLINIC | Age: 73
End: 2018-08-09
Payer: MEDICARE

## 2018-08-09 VITALS
HEIGHT: 70 IN | RESPIRATION RATE: 16 BRPM | HEART RATE: 80 BPM | WEIGHT: 208.88 LBS | DIASTOLIC BLOOD PRESSURE: 53 MMHG | BODY MASS INDEX: 29.9 KG/M2 | SYSTOLIC BLOOD PRESSURE: 122 MMHG

## 2018-08-09 DIAGNOSIS — I74.9 TIA DUE TO EMBOLISM: ICD-10-CM

## 2018-08-09 DIAGNOSIS — G45.9 TIA DUE TO EMBOLISM: ICD-10-CM

## 2018-08-09 DIAGNOSIS — Z86.73 HISTORY OF STROKE: Primary | ICD-10-CM

## 2018-08-09 DIAGNOSIS — G50.0 TRIGEMINAL NEURALGIA OF RIGHT SIDE OF FACE: ICD-10-CM

## 2018-08-09 PROCEDURE — 99213 OFFICE O/P EST LOW 20 MIN: CPT | Mod: PBBFAC,PO | Performed by: PSYCHIATRY & NEUROLOGY

## 2018-08-09 PROCEDURE — 99999 PR PBB SHADOW E&M-EST. PATIENT-LVL III: CPT | Mod: PBBFAC,,, | Performed by: PSYCHIATRY & NEUROLOGY

## 2018-08-09 PROCEDURE — 99214 OFFICE O/P EST MOD 30 MIN: CPT | Mod: S$PBB,,, | Performed by: PSYCHIATRY & NEUROLOGY

## 2018-08-09 RX ORDER — ROSUVASTATIN CALCIUM 20 MG/1
20 TABLET, COATED ORAL DAILY
Qty: 30 TABLET | Refills: 11 | Status: SHIPPED | OUTPATIENT
Start: 2018-08-09 | End: 2019-09-05 | Stop reason: SDUPTHER

## 2018-08-09 NOTE — PATIENT INSTRUCTIONS
PLAN:  -- stopping aspirin (as you already are)  -- stop simvastatin (cholesterol)   -- REPLACE with rosuvastatin 20mg daily (for improved stroke protection given you had to stop aspirin) (Use [employer] coupon)  -- continue Trileptal (oxcarbazepime) 150mg (half pill) in AM and 300mg at night - OK to move to 300mg twice a day or 150/150/300mg as tolerated and if needed for pain

## 2018-08-09 NOTE — PROGRESS NOTES
Date of service: 8/9/2018  Referring provider: No ref. provider found    Subjective:      Chief complaint: Facial Pain and Transient Ischemic Attack       Patient ID: Chester Quinones is a 73 y.o. 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 He had blood work yesterday showing stable hyponatremia and stable anemia (8.2 / 28)     In the interim he had lot of bleeding associated with the nephrostomy tube change last month and had to completely stop aspirin. He is anemic but this is stable. He is on procrit and will start iron infusions. His energy level is down significantly since this last event.     His trigeminal neuralgia has been compltely in remission on trileptal 150mg / 300mg.     INTERVAL HISTORY - 4/20/18    In the interim he had worse trigeminal neuralgia pain (right) and his confusion was better so we started trileptal - has been only able to tolerate 150mg in AM and 300mg in Pm. He does report the TN is much better though his wife says he cannot tolerate the full 300mg BID just yet.     Recently had event of hematuria from his nephrostomy tube - Right side had blood left side had pus. He was admitted this week after this for nephrostomy tube change and had to have ABX for UTI, and fluids for dehydration. He has been unstable since the hospital and using his cane voluntarily. He will now have the nephrostomy tube changed every 2 months  "instead of every 3 months. He is still not back to his physical baseline after this infection.     INTERVAL HISTORY - 3/22/18     Patient reports he is good but his wife reports his memory has been poor for the past 2-3 weeks, she is constantly having to repeat things to him. His trigeminal neuralgia (right) is back - nose, lip, eye. He does not describe this as a pain but as a "symptom, like a nerve, it is so minor." He fell 2 months ago, on the grass, hitting the left forehead, but no more recent head trauma. He was treated for a UTI one month ago, no recent fevers or chills or other systemic symptoms.     INTERVAL HISTORY - 12/22/17     I last saw patient in [facility] for consultation for stroke/TIA which I was concerned were due to cardioembolic source possible cancerous based on appearance. He had a negative ANTWAN in the hospital. He also had a EEG in the hospital because of recurrent episodes of confusion which was normal. I recommended Pradaxa for stroke prevention, this was deferred in the setting of bilateral nephrostomy tubes and suprapubic catheter which had previously been affected with bleeding.     He was discharged on full dose ASA 325mg, lisinopril 5mg and continued on simvastatin 40mg daily. His urologist feels he is very high risk for bleeding from the urological sources (has bled in the setting of NO thinners before).     Today they report she is doing better. His energy is better. His appetite has increased, gaining weight, walking without the assistance of walker. He is in PT and Ot. He has had no further TIAs. He is on a 30 day heart monitor - has has not had any events.     He is having nephrostomy tubes changes 1/4/17 and asks when to stop aspirin. He is having active hematuria in the suprapubic catheter which started in the hospital (he had this problem before aspirin as well).     ORIGINAL NEURO HISTORY - 12/3/17     Mr. Quinones has been feeling generally unwell for months, since his last episode " "of sepsis. He was undergoing an outpatient stress test with Dr. Molina on 11/30/17. On the drive back home, he had a 20 min episode of speaking "gibberish" and being very frustrated doing so. Even after his language disturbance resolved, his wife noticed he had a hard time walking. Later that day he had a headache. His wife wanted him to be checked out but he declined. On the morning of presentation, 12/1/17 he woke up at 5am. His wife said his gait was unsteady and it was unusual of him to request a wheelchair to the doctor's office. He went for a routine visit with his oncologist where he was noted to have slow speech and unsteady gait, and was recommended to go to the Ed. In the ED his vitals were 136/110, 72, afebrile and was found to have a left facial droop.      Mr. Quinones also reports having a "TIA" in feb 2017 during which he was confused for a couple of days but it does not seem that he had focal neurological findings. During this time he was on aspirin, and he had been on aspirin for years but in Aug 2017 stopped when he had bleeding from the right nephrostomy tube and had not gone back. He reports no other known neurological events. He feels tired.    UA was "dirty" but this is felt to be chronic.      HOSPITAL COURSE:     12/3/17: patient had ANTWAN today, awaiting results. His wife mentioned to me that during this hospitalization he has not seemed himself, specifically, having intermittent periods of confusion. She is very scared of brining him home.     Review of patient's allergies indicates:   Allergen Reactions    Niacin Rash    Ativan [lorazepam] Hallucinations     Current Outpatient Prescriptions   Medication Sig Dispense Refill    cyanocobalamin (VITAMIN B-12) 1000 MCG tablet Take 1,000 mcg by mouth once daily.      diazePAM (VALIUM) 10 MG Tab Take 1 tablet (10 mg total) by mouth every 12 (twelve) hours. Takes 5 mg am and 10 mg pm 60 tablet 3    enzalutamide 40 mg Cap Take 160 mg by mouth once " daily. 120 capsule 6    FLUoxetine (PROZAC) 20 MG capsule Take 1 capsule (20 mg total) by mouth once daily. 30 capsule 6    folic acid (FOLVITE) 400 MCG tablet Take 400 mcg by mouth once daily.      gabapentin (NEURONTIN) 600 MG tablet Take 600 mg by mouth 3 (three) times daily. Use prior to surgery      leuprolide, 6 month, (ELIGARD) 45 mg (6 month) injection Inject 45 mg into the skin every 3 (three) months. Due February 12, 2018      losartan (COZAAR) 25 MG tablet Take 1 tablet (25 mg total) by mouth once daily. 90 tablet 0    omeprazole (PRILOSEC) 40 MG capsule Take 40 mg by mouth once daily. Use prior to surgery      ondansetron (ZOFRAN-ODT) 8 MG TbDL Take 1 tablet (8 mg total) by mouth every 12 (twelve) hours as needed. 30 tablet 1    OXcarbazepine (TRILEPTAL) 300 MG Tab Take 1 tablet (300 mg total) by mouth 2 (two) times daily. 60 tablet 3    oxybutynin (DITROPAN) 5 MG Tab Take 5 mg by mouth 2 (two) times daily. Use prior to surgery      temazepam (RESTORIL) 15 mg Cap Take 1 capsule (15 mg total) by mouth nightly as needed. 30 capsule 3    VITAMIN D2 50,000 unit capsule take 1 capsule by oral route every week for 4 weeks then 1 capsule by mouth every month for 5 months  0    rosuvastatin (CRESTOR) 20 MG tablet Take 1 tablet (20 mg total) by mouth once daily. 30 tablet 11     No current facility-administered medications for this visit.        Past Medical History  Past Medical History:   Diagnosis Date    Anemia     wife stated he bleeds from right kidney    Arthritis     Asbestosis     Cataract     Colorectal cancer 2015    COPD (chronic obstructive pulmonary disease)     Coronary artery disease     stent x 1    Encounter for blood transfusion     History of suprapubic catheter     Hyperlipidemia     Hypertension     Incontinence     Kidney insufficiency     Myocardial infarction     Neuropathy     Prostate cancer 2005    and in 2010    Radiation injury     after prostate cancer  treatment    Restless leg     Septic shock     Stroke     12/1/17    TIA (transient ischemic attack) 2016    Trigeminal neuralgia     to face & neck       Past Surgical History  Past Surgical History:   Procedure Laterality Date    APPENDECTOMY      COLON SURGERY  11/17/2016    colon resection, ileostomy    CORONARY STENT PLACEMENT  1997    EYE SURGERY      bilat cataract, detached retina bilat    Incision and drainage right buttock abscess Right 10/29/15    NEPHROSTOMY Bilateral     PERCUTANEOUS NEPHROSTOMY N/A 7/10/2018    Procedure: Lihbusltc-kriesxrqmytd-rznstzencjl tube change tubes;  Surgeon: Isra Bruce MD;  [facility];  Service: Radiology;  Laterality: N/A;    super pubic catheter      TONSILLECTOMY      Age 1    VASECTOMY      vasectomy reversed         Family History  Family History   Problem Relation Age of Onset    Alzheimer's disease Mother     Mental illness Mother     Diabetes Mother     Hypertension Mother     Hearing loss Mother     Alzheimer's disease Father     Hearing loss Father     Arthritis Father        Social History  Social History     Social History    Marital status:      Spouse name: N/A    Number of children: N/A    Years of education: N/A     Occupational History    Not on file.     Social History Main Topics    Smoking status: Former Smoker     Packs/day: 1.00     Years: 40.00     Quit date: 1/1/2004    Smokeless tobacco: Never Used    Alcohol use No    Drug use: No    Sexual activity: Yes     Partners: Female     Other Topics Concern    Not on file     Social History Narrative    No narrative on file        Review of Systems  14-point review of systems as follows:   No check charles indicates NEGATIVE response   Constitutional: [] weight loss, [] change to appetite   Eyes: [] change in vision, [] double vision   Ears, nose, mouth, throat: [] frequent nose bleeds, [] ringing in the ears   Respiratory: [x] cough, [] wheezing    Cardiovascular: [] chest pain, [] palpitations   Gastrointestinal: [] jaundice, [] nausea/vomiting   Genitourinary: [] incontinence, [] burning with urination   Hematologic/lymphatic: [x] easy bruising/bleeding, [] night sweats   Neurological: [x] numbness, [] weakness   Endocrine: [] fatigue, [] heat/cold intolerance   Allergy/Immunologic: [] fevers, [] chills   Musculoskeletal: [] muscle pain, [] joint pain   Psychiatric: [] thoughts of harming self/others, [] depression   Integumentary: [] rashes, [] sores that do not heal       Objective:        Vitals:    08/09/18 1356   BP: (!) 122/53   Pulse: 80   Resp: 16     Body mass index is 29.97 kg/m².    PHYSICAL EXAM:  Patient appears with more color and with more energy than other days    PREVIOUS:   NEUROLOGICAL EXAM:  Mental status: Awake, alert, and oriented to person, place, and time. Recent and remote memory appear to be intact. MOCA 27/30 (normal >26)  Speech/Language: No aphasia on conversation. Intermittently, speech is slightly slurred. Cranial nerves: Visual fields of right eye full, left eye legally blind. Pupils equal round and reactive to light, extraocular movements intact, facial strength intact bilaterally, facial sensation V1-V3 intact bilaterally, palate symmetric, tongue midline, hearing grossly intact bilaterally.  Motor: no pronator drift. No orbit. 5 out of 5 strength throughout the upper and lower extremities bilaterally. Normal bulk and tone. No tremor. No asterixis.   Sensation: Intact to light touch bilaterally.  DTR: 1+ at the knees and biceps bilaterally.  Coordination: Finger-nose-finger testing normal bilaterally.  Gait: normal          Data Review:     I have personally reviewed the referring provider's notes, labs, & imaging made available to me today.     12/4/17 EEG spot - normal     12/2/17 CTA head / neck - normal     Results for orders placed or performed during the hospital encounter of 12/01/17   MRI Brain Without Contrast     Narrative    Indication: Neurologic deficit, slurred speech, altered mental status.    Procedure:    MRI brain without contrast.    Comparison examination: Noncontrast CT of the brain: 12/1/2017    Technique:    Routine fat and water-weighted sequences were obtained.    Findings:    Limited study secondary to motion artifact.  There is advanced generalized atrophy both centrally and over the convexities without midline shift or mass effect.  Areas of increased T2 and flair weighted signal change about the ventricular white matter, basal ganglia as well as the brainstem are consistent with areas of remote ischemia and/or demyelinization.    The diffusion sequence demonstrates a small, 5 mm linear focus of diffusion restriction at the level of the left occipital lobe, image 72 series 8.  This is increased in signal on the flair weighted sequence and may simply represent artifactual change, a tiny focal area of subacute infarct would be in the differential.    Normal signal flow-voids present about the skull base.    No acute hemorrhage on the T1 sequence.    Impression    1.  Small focal area of increased diffusion restriction which, although may be artifactual, likely represents an area of subacute infarction within the left occipital lobe but without underlying parenchymal hemorrhage, or midline shift.    2.  Remote ischemic changes with advanced generalized atrophy.      Electronically signed by: MADELINE DHILLON MD  Date:     12/02/17  Time:    11:17    CT Head Without Contrast    Narrative    CT HEAD WITHOUT CONTRAST: 12/01/2017 at 1336    HISTORY: Clinical history is slurred speech, history of colorectal and prostate cancer.  No history of recent trauma or recent surgery is provided.    Comparison: CT head 02/02/2017.    TECHNIQUE: Multiple contiguous axial images were acquired from the base of the skull and the vertex without contrast administration. Total DLP for the study is approximately 586 mGy-cm.  Automatic exposure control was utilized.    FINDINGS:No mass effect, hemorrhage or hydrocephalus is appreciated. Symmetric appearance of the soft tissues, orbits. No significant mucosal thickening or paranasal sinus fluid levels are appreciated. No displaced fracture or dislocation is appreciated. Motion, streak artifact is present. Mild volume loss and chronic appearing periventricular white matter changes are appreciated.  There are dense choroid calcifications as well as dural calcification similar overall with mild atherosclerotic vascular calcifications and partially empty sella variant.  There are some minimal central lacunar changes present similar also. Correlate overall with the patient's clinical findings. No significant interval changes are appreciated.    Impression     No interval mass effect, displaced fracture or hemorrhage is appreciated.  No significant interval intracranial changes are appreciated.        Electronically signed by: CAROL NERI MD  Date:     12/01/17  Time:    13:43    Results for orders placed or performed during the hospital encounter of 02/02/17   CT Head W Wo Contrast    Narrative    CT HEAD WITH AND WITHOUT CONTRAST:    HISTORY:  Frequent falls, confusion, prostate cancer with bone metastasis.    TECHNIQUE: Multiple contiguous axial images were acquired from the base of the skull to the vertex before and following intravenous contrast administration.  Coronal and sagittal reformations were obtained.  Total DLP for the study is approximately 1243 mGy-cm.  Automated exposure control was utilized to reduce radiation dose.      COMPARISON: None available    FINDINGS:    There is diffuse parenchymal atrophy..  There is patchy periventricular and deep white matter hypoattenuation compatible with chronic micro-ischemia.  There is intracranial atherosclerosis.  No evidence of acute intracranial hemorrhage, mass effect, midline deviation, hydrocephalus, or abnormal extra-axial fluid  collection is seen. No evidence of acute large vessel territory ischemia is seen. The basilar cisterns are preserved.  No pathologic enhancement is identified.  The visualized paranasal sinuses are clear.  The mastoid air cells are grossly clear.  No acute displaced calvarial abnormality is appreciated.    Impression    1. No acute intracranial process is identified.  2.  No pathologic enhancement identified.      Electronically signed by: CAS ALMANZA MD  Date:     02/02/17  Time:    12:27        Lab Results   Component Value Date     (L) 08/08/2018    K 3.5 08/08/2018    MG 1.9 12/16/2016    CL 98 08/08/2018    CO2 24 08/08/2018    BUN 12 08/08/2018    CREATININE 1.12 08/08/2018     (H) 08/08/2018    HGBA1C 5.1 12/01/2017    AST 10 (L) 08/08/2018    AST 32 04/27/2016    ALT 25 08/08/2018    ALBUMIN 3.6 08/08/2018    PROT 6.3 08/08/2018    BILITOT 0.5 08/08/2018    CHOL 154 12/01/2017    HDL 27 (L) 12/01/2017    LDLCALC 83.0 12/01/2017    TRIG 220 (H) 12/01/2017       Lab Results   Component Value Date    WBC 4.70 08/08/2018    HGB 8.2 (L) 08/08/2018    HCT 28.2 (L) 08/08/2018    MCV 76 (L) 08/08/2018     08/08/2018       Lab Results   Component Value Date    TSH 2.170 02/23/2018       Assessment & Plan:       Problem List Items Addressed This Visit        Neuro    TIA due to embolism    Overview     Mr. Quinones had two neurological events  in time and space. He is right handed. On 11/30/17 he had a 20 min episode of aphasia and on 12/1/17 he had a witnessed left facial droop which is now resolved. He has no acute stroke on imaging to account for each of these events, and these events represent separate vascular distributions.     Based on different vascular distributions and old strokes I have a very high suspicion for cardioembolic source. He does have metastatic prostate cancer but his ddimer was negative implying low likelihood of malignancy related stroke. His ANTWAN was negative for  clot or other source of stroke.     Hospital workup did not reveal a source. I think he likely has very occult atrial fibrillation. Collectively decided against pradaxa with his team do to problems with hematuria from the bladder and kidneys due to the suprapubic cath and nephrostomy tube. I support this.     --------------------    He has had to stop aspirin completely due to severe bleeding and symptomatic anemia associated with his nephrostomy tube changes. We discussed that while this does place him at increased risk of stroke going forward, this is a potential risk, and the current risk is ongoing anemia, fatigue and poor quality of life. Thus it does make more sense at this time to keep him off aspirin/. However, to improve his secondary stroke prevention I would like him to change simvastatin to rosuvastatin. Atorvastatin interacts with Xtandi so will not be pursued.          Trigeminal neuralgia of right side of face    Overview     Taken off tegretol due to medication interactions with his prostate cancer regimen. Trileptal without these interacitons and low dose has been helpful. May use additional half tab (150mg) PRN in addition to his current 150/300 regimen.          History of stroke - Primary    Overview     He has numerous other embolic looking strokes of different ages on his head CT and brain MRI - there is a subacute appearing left occipital lobe stroke in the distribution of the left PCA which can not account for any of the symptoms he presented with and likely happened in the recent days to weeks as a subclinical event (he reported no change to the right visual field), there is a left cerebellar hemisphere old stroke (encephalomalacia) and a right deep frontal lobe old lacunar stroke (encephalomalacia) both which occurred prior to 2/2017 as they were already present on CT head then, despite Mr. Quinones being on aspirin at that time.                       .     PLAN:  -- stopping aspirin (as you  already are)  -- stop simvastatin (cholesterol)   -- REPLACE with rosuvastatin 20mg daily (for improved stroke protection given you had to stop aspirin)   -- continue Trileptal (oxcarbazepime) 150mg (half pill) in AM and 300mg at night - OK to move to 300mg twice a day or 150/150/300mg as tolerated and if needed for pain       Follow-up in about 6 months (around 2/9/2019).      Jessica Bernardo MD

## 2018-08-15 ENCOUNTER — INFUSION (OUTPATIENT)
Dept: INFUSION THERAPY | Facility: HOSPITAL | Age: 73
End: 2018-08-15
Attending: INTERNAL MEDICINE
Payer: MEDICARE

## 2018-08-15 VITALS
OXYGEN SATURATION: 99 % | BODY MASS INDEX: 29.08 KG/M2 | TEMPERATURE: 98 F | DIASTOLIC BLOOD PRESSURE: 73 MMHG | HEIGHT: 70 IN | RESPIRATION RATE: 18 BRPM | WEIGHT: 203.13 LBS | HEART RATE: 64 BPM | SYSTOLIC BLOOD PRESSURE: 154 MMHG

## 2018-08-15 DIAGNOSIS — C61 PROSTATE CA: ICD-10-CM

## 2018-08-15 DIAGNOSIS — D63.8 ANEMIA, CHRONIC DISEASE: Primary | ICD-10-CM

## 2018-08-15 PROCEDURE — 96365 THER/PROPH/DIAG IV INF INIT: CPT | Mod: PN

## 2018-08-15 PROCEDURE — 25000003 PHARM REV CODE 250: Mod: PN | Performed by: PHYSICIAN ASSISTANT

## 2018-08-15 PROCEDURE — 63600175 PHARM REV CODE 636 W HCPCS: Mod: TB,EA,PN | Performed by: PHYSICIAN ASSISTANT

## 2018-08-15 PROCEDURE — 96372 THER/PROPH/DIAG INJ SC/IM: CPT | Mod: PN,59

## 2018-08-15 RX ORDER — HEPARIN 100 UNIT/ML
500 SYRINGE INTRAVENOUS
Status: CANCELLED | OUTPATIENT
Start: 2018-08-15

## 2018-08-15 RX ORDER — SODIUM CHLORIDE 0.9 % (FLUSH) 0.9 %
10 SYRINGE (ML) INJECTION
Status: CANCELLED | OUTPATIENT
Start: 2018-08-15

## 2018-08-15 RX ORDER — SODIUM CHLORIDE 0.9 % (FLUSH) 0.9 %
10 SYRINGE (ML) INJECTION
Status: DISCONTINUED | OUTPATIENT
Start: 2018-08-15 | End: 2018-08-15 | Stop reason: HOSPADM

## 2018-08-15 RX ADMIN — SODIUM CHLORIDE: 9 INJECTION, SOLUTION INTRAVENOUS at 12:08

## 2018-08-15 RX ADMIN — ERYTHROPOIETIN 40000 UNITS: 40000 INJECTION, SOLUTION INTRAVENOUS; SUBCUTANEOUS at 01:08

## 2018-08-15 RX ADMIN — FERUMOXYTOL 510 MG: 510 INJECTION INTRAVENOUS at 12:08

## 2018-08-15 NOTE — PLAN OF CARE
Problem: Patient Care Overview  Goal: Plan of Care Review  Outcome: Ongoing (interventions implemented as appropriate)  Pt arrived for Feraheme #1 along with Procrit Inj. #24g PIV X 1 attempt to LAC, + blood return noted/flushes easily. Pt tolerated treatment very well along with Procrit Inj to LUE. Pt observed 60 mins post infusion w/o complications. PIV D/C'd w/o difficulty. Pt D/C'd home with family & instructions

## 2018-08-22 ENCOUNTER — INFUSION (OUTPATIENT)
Dept: INFUSION THERAPY | Facility: HOSPITAL | Age: 73
End: 2018-08-22
Attending: INTERNAL MEDICINE
Payer: MEDICARE

## 2018-08-22 VITALS
SYSTOLIC BLOOD PRESSURE: 172 MMHG | RESPIRATION RATE: 18 BRPM | DIASTOLIC BLOOD PRESSURE: 74 MMHG | TEMPERATURE: 96 F | BODY MASS INDEX: 29.8 KG/M2 | HEIGHT: 70 IN | HEART RATE: 69 BPM | WEIGHT: 208.13 LBS | OXYGEN SATURATION: 98 %

## 2018-08-22 DIAGNOSIS — C61 PROSTATE CA: ICD-10-CM

## 2018-08-22 DIAGNOSIS — D63.8 ANEMIA, CHRONIC DISEASE: Primary | ICD-10-CM

## 2018-08-22 PROCEDURE — 96372 THER/PROPH/DIAG INJ SC/IM: CPT | Mod: PN

## 2018-08-22 PROCEDURE — A4216 STERILE WATER/SALINE, 10 ML: HCPCS | Mod: PN | Performed by: PHYSICIAN ASSISTANT

## 2018-08-22 PROCEDURE — 25000003 PHARM REV CODE 250: Mod: PN | Performed by: PHYSICIAN ASSISTANT

## 2018-08-22 PROCEDURE — 96374 THER/PROPH/DIAG INJ IV PUSH: CPT | Mod: PN

## 2018-08-22 PROCEDURE — 63600175 PHARM REV CODE 636 W HCPCS: Mod: TB,EA,PN | Performed by: PHYSICIAN ASSISTANT

## 2018-08-22 RX ORDER — HEPARIN 100 UNIT/ML
500 SYRINGE INTRAVENOUS
Status: CANCELLED | OUTPATIENT
Start: 2018-08-22

## 2018-08-22 RX ORDER — SODIUM CHLORIDE 0.9 % (FLUSH) 0.9 %
10 SYRINGE (ML) INJECTION
Status: DISCONTINUED | OUTPATIENT
Start: 2018-08-22 | End: 2018-08-22 | Stop reason: HOSPADM

## 2018-08-22 RX ORDER — SODIUM CHLORIDE 0.9 % (FLUSH) 0.9 %
10 SYRINGE (ML) INJECTION
Status: CANCELLED | OUTPATIENT
Start: 2018-08-22

## 2018-08-22 RX ADMIN — Medication 10 ML: at 11:08

## 2018-08-22 RX ADMIN — FERUMOXYTOL 510 MG: 510 INJECTION INTRAVENOUS at 11:08

## 2018-08-22 RX ADMIN — ERYTHROPOIETIN 40000 UNITS: 40000 INJECTION, SOLUTION INTRAVENOUS; SUBCUTANEOUS at 12:08

## 2018-08-22 RX ADMIN — SODIUM CHLORIDE: 9 INJECTION, SOLUTION INTRAVENOUS at 11:08

## 2018-08-30 ENCOUNTER — LAB VISIT (OUTPATIENT)
Dept: LAB | Facility: HOSPITAL | Age: 73
End: 2018-08-30
Attending: INTERNAL MEDICINE
Payer: MEDICARE

## 2018-08-30 DIAGNOSIS — C61 MALIGNANT NEOPLASM OF PROSTATE: ICD-10-CM

## 2018-08-30 LAB
ALBUMIN SERPL BCP-MCNC: 3.5 G/DL
ALP SERPL-CCNC: 78 U/L
ALT SERPL W/O P-5'-P-CCNC: 22 U/L
ANION GAP SERPL CALC-SCNC: 8 MMOL/L
ANISOCYTOSIS BLD QL SMEAR: ABNORMAL
AST SERPL-CCNC: 11 U/L
BASOPHILS # BLD AUTO: 0.04 K/UL
BASOPHILS NFR BLD: 0.8 %
BILIRUB SERPL-MCNC: 0.3 MG/DL
BUN SERPL-MCNC: 17 MG/DL
CALCIUM SERPL-MCNC: 9.2 MG/DL
CHLORIDE SERPL-SCNC: 103 MMOL/L
CO2 SERPL-SCNC: 27 MMOL/L
COMPLEXED PSA SERPL-MCNC: 1.4 NG/ML
CREAT SERPL-MCNC: 1.16 MG/DL
DACRYOCYTES BLD QL SMEAR: ABNORMAL
DIFFERENTIAL METHOD: ABNORMAL
EOSINOPHIL # BLD AUTO: 0.3 K/UL
EOSINOPHIL NFR BLD: 5.4 %
ERYTHROCYTE [DISTWIDTH] IN BLOOD BY AUTOMATED COUNT: 23.9 %
EST. GFR  (AFRICAN AMERICAN): >60 ML/MIN/1.73 M^2
EST. GFR  (NON AFRICAN AMERICAN): >60 ML/MIN/1.73 M^2
GIANT PLATELETS BLD QL SMEAR: PRESENT
GLUCOSE SERPL-MCNC: 89 MG/DL
HCT VFR BLD AUTO: 33.9 %
HGB BLD-MCNC: 10.1 G/DL
HYPOCHROMIA BLD QL SMEAR: ABNORMAL
LYMPHOCYTES # BLD AUTO: 0.6 K/UL
LYMPHOCYTES NFR BLD: 11.8 %
MCH RBC QN AUTO: 24.5 PG
MCHC RBC AUTO-ENTMCNC: 29.8 G/DL
MCV RBC AUTO: 82 FL
MONOCYTES # BLD AUTO: 0.6 K/UL
MONOCYTES NFR BLD: 11.8 %
NEUTROPHILS # BLD AUTO: 3.5 K/UL
NEUTROPHILS NFR BLD: 70.2 %
NRBC BLD-RTO: 0 /100 WBC
OVALOCYTES BLD QL SMEAR: ABNORMAL
PLATELET # BLD AUTO: 288 K/UL
PMV BLD AUTO: 9.2 FL
POIKILOCYTOSIS BLD QL SMEAR: SLIGHT
POLYCHROMASIA BLD QL SMEAR: ABNORMAL
POTASSIUM SERPL-SCNC: 3.6 MMOL/L
PROT SERPL-MCNC: 6.4 G/DL
RBC # BLD AUTO: 4.13 M/UL
SODIUM SERPL-SCNC: 138 MMOL/L
WBC # BLD AUTO: 5.02 K/UL

## 2018-08-30 PROCEDURE — 80053 COMPREHEN METABOLIC PANEL: CPT

## 2018-08-30 PROCEDURE — 36415 COLL VENOUS BLD VENIPUNCTURE: CPT | Mod: PN

## 2018-08-30 PROCEDURE — 84153 ASSAY OF PSA TOTAL: CPT | Mod: PN

## 2018-08-30 PROCEDURE — 85025 COMPLETE CBC W/AUTO DIFF WBC: CPT | Mod: PN

## 2018-08-30 PROCEDURE — 84153 ASSAY OF PSA TOTAL: CPT

## 2018-08-30 PROCEDURE — 80053 COMPREHEN METABOLIC PANEL: CPT | Mod: PN

## 2018-08-30 PROCEDURE — 85025 COMPLETE CBC W/AUTO DIFF WBC: CPT

## 2018-09-06 ENCOUNTER — LAB VISIT (OUTPATIENT)
Dept: LAB | Facility: HOSPITAL | Age: 73
End: 2018-09-06
Attending: INTERNAL MEDICINE
Payer: MEDICARE

## 2018-09-06 DIAGNOSIS — C61 MALIGNANT NEOPLASM OF PROSTATE: ICD-10-CM

## 2018-09-06 LAB
BASOPHILS # BLD AUTO: 0.05 K/UL
BASOPHILS NFR BLD: 0.8 %
DIFFERENTIAL METHOD: ABNORMAL
EOSINOPHIL # BLD AUTO: 0.4 K/UL
EOSINOPHIL NFR BLD: 5.3 %
ERYTHROCYTE [DISTWIDTH] IN BLOOD BY AUTOMATED COUNT: 23.4 %
HCT VFR BLD AUTO: 37.7 %
HGB BLD-MCNC: 11.4 G/DL
LYMPHOCYTES # BLD AUTO: 0.8 K/UL
LYMPHOCYTES NFR BLD: 11.8 %
MCH RBC QN AUTO: 25 PG
MCHC RBC AUTO-ENTMCNC: 30.2 G/DL
MCV RBC AUTO: 83 FL
MONOCYTES # BLD AUTO: 0.6 K/UL
MONOCYTES NFR BLD: 9 %
NEUTROPHILS # BLD AUTO: 4.8 K/UL
NEUTROPHILS NFR BLD: 73.1 %
NRBC BLD-RTO: 0 /100 WBC
PLATELET # BLD AUTO: 238 K/UL
PMV BLD AUTO: 8.7 FL
RBC # BLD AUTO: 4.56 M/UL
WBC # BLD AUTO: 6.59 K/UL

## 2018-09-06 PROCEDURE — 85025 COMPLETE CBC W/AUTO DIFF WBC: CPT

## 2018-09-06 PROCEDURE — 85025 COMPLETE CBC W/AUTO DIFF WBC: CPT | Mod: PN

## 2018-09-06 PROCEDURE — 36415 COLL VENOUS BLD VENIPUNCTURE: CPT | Mod: PN

## 2018-09-13 ENCOUNTER — LAB VISIT (OUTPATIENT)
Dept: LAB | Facility: HOSPITAL | Age: 73
End: 2018-09-13
Attending: INTERNAL MEDICINE
Payer: MEDICARE

## 2018-09-13 DIAGNOSIS — C61 MALIGNANT NEOPLASM OF PROSTATE: ICD-10-CM

## 2018-09-13 LAB
BASOPHILS # BLD AUTO: 0.04 K/UL
BASOPHILS NFR BLD: 0.7 %
DIFFERENTIAL METHOD: ABNORMAL
EOSINOPHIL # BLD AUTO: 0.3 K/UL
EOSINOPHIL NFR BLD: 5.3 %
ERYTHROCYTE [DISTWIDTH] IN BLOOD BY AUTOMATED COUNT: 22 %
HCT VFR BLD AUTO: 35.8 %
HGB BLD-MCNC: 11.2 G/DL
LYMPHOCYTES # BLD AUTO: 0.7 K/UL
LYMPHOCYTES NFR BLD: 12.4 %
MCH RBC QN AUTO: 25.6 PG
MCHC RBC AUTO-ENTMCNC: 31.3 G/DL
MCV RBC AUTO: 82 FL
MONOCYTES # BLD AUTO: 0.3 K/UL
MONOCYTES NFR BLD: 5.7 %
NEUTROPHILS # BLD AUTO: 4.4 K/UL
NEUTROPHILS NFR BLD: 75.9 %
NRBC BLD-RTO: 0 /100 WBC
PLATELET # BLD AUTO: 207 K/UL
PMV BLD AUTO: 9.2 FL
RBC # BLD AUTO: 4.38 M/UL
WBC # BLD AUTO: 5.81 K/UL

## 2018-09-13 PROCEDURE — 85025 COMPLETE CBC W/AUTO DIFF WBC: CPT | Mod: PN

## 2018-09-13 PROCEDURE — 85025 COMPLETE CBC W/AUTO DIFF WBC: CPT

## 2018-09-13 PROCEDURE — 36415 COLL VENOUS BLD VENIPUNCTURE: CPT | Mod: PN

## 2018-09-14 PROBLEM — C79.51 PROSTATE CANCER METASTATIC TO BONE: Status: ACTIVE | Noted: 2017-02-16

## 2018-09-14 PROBLEM — R33.9 RETENTION OF URINE, UNSPECIFIED: Status: ACTIVE | Noted: 2018-09-14

## 2018-09-14 PROBLEM — G47.09 OTHER INSOMNIA: Status: ACTIVE | Noted: 2018-09-14

## 2018-09-14 PROBLEM — R50.82 POST-PROCEDURAL FEVER: Status: ACTIVE | Noted: 2018-09-14

## 2018-09-20 ENCOUNTER — LAB VISIT (OUTPATIENT)
Dept: LAB | Facility: HOSPITAL | Age: 73
End: 2018-09-20
Attending: PHYSICIAN ASSISTANT
Payer: MEDICARE

## 2018-09-20 DIAGNOSIS — C61 MALIGNANT NEOPLASM OF PROSTATE: ICD-10-CM

## 2018-09-20 LAB
BASOPHILS # BLD AUTO: 0.04 K/UL
BASOPHILS NFR BLD: 0.5 %
DIFFERENTIAL METHOD: ABNORMAL
EOSINOPHIL # BLD AUTO: 0.4 K/UL
EOSINOPHIL NFR BLD: 4.9 %
ERYTHROCYTE [DISTWIDTH] IN BLOOD BY AUTOMATED COUNT: 21.7 %
HCT VFR BLD AUTO: 33.5 %
HGB BLD-MCNC: 10.7 G/DL
LYMPHOCYTES # BLD AUTO: 0.8 K/UL
LYMPHOCYTES NFR BLD: 10.1 %
MCH RBC QN AUTO: 26.1 PG
MCHC RBC AUTO-ENTMCNC: 31.9 G/DL
MCV RBC AUTO: 82 FL
MONOCYTES # BLD AUTO: 0.8 K/UL
MONOCYTES NFR BLD: 9.9 %
NEUTROPHILS # BLD AUTO: 6.2 K/UL
NEUTROPHILS NFR BLD: 74.6 %
NRBC BLD-RTO: 0 /100 WBC
PLATELET # BLD AUTO: 215 K/UL
PMV BLD AUTO: 9.2 FL
RBC # BLD AUTO: 4.1 M/UL
WBC # BLD AUTO: 8.3 K/UL

## 2018-09-20 PROCEDURE — 36415 COLL VENOUS BLD VENIPUNCTURE: CPT | Mod: PN

## 2018-09-20 PROCEDURE — 85025 COMPLETE CBC W/AUTO DIFF WBC: CPT

## 2018-09-20 PROCEDURE — 85025 COMPLETE CBC W/AUTO DIFF WBC: CPT | Mod: PN

## 2018-09-27 ENCOUNTER — LAB VISIT (OUTPATIENT)
Dept: LAB | Facility: HOSPITAL | Age: 73
End: 2018-09-27
Attending: INTERNAL MEDICINE
Payer: MEDICARE

## 2018-09-27 DIAGNOSIS — D50.0 IRON DEFICIENCY ANEMIA DUE TO CHRONIC BLOOD LOSS: ICD-10-CM

## 2018-09-27 DIAGNOSIS — C61 MALIGNANT NEOPLASM OF PROSTATE: ICD-10-CM

## 2018-09-27 LAB
ALBUMIN SERPL BCP-MCNC: 3.7 G/DL
ALP SERPL-CCNC: 91 U/L
ALT SERPL W/O P-5'-P-CCNC: 24 U/L
ANION GAP SERPL CALC-SCNC: 12 MMOL/L
AST SERPL-CCNC: 12 U/L
BASOPHILS # BLD AUTO: 0.04 K/UL
BASOPHILS NFR BLD: 0.5 %
BILIRUB SERPL-MCNC: 0.3 MG/DL
BUN SERPL-MCNC: 16 MG/DL
CALCIUM SERPL-MCNC: 8.8 MG/DL
CHLORIDE SERPL-SCNC: 99 MMOL/L
CO2 SERPL-SCNC: 22 MMOL/L
COMPLEXED PSA SERPL-MCNC: 1.7 NG/ML
CREAT SERPL-MCNC: 1.04 MG/DL
DIFFERENTIAL METHOD: ABNORMAL
EOSINOPHIL # BLD AUTO: 0.5 K/UL
EOSINOPHIL NFR BLD: 6.2 %
ERYTHROCYTE [DISTWIDTH] IN BLOOD BY AUTOMATED COUNT: 20.9 %
EST. GFR  (AFRICAN AMERICAN): >60 ML/MIN/1.73 M^2
EST. GFR  (NON AFRICAN AMERICAN): >60 ML/MIN/1.73 M^2
FERRITIN SERPL-MCNC: 105 NG/ML
GLUCOSE SERPL-MCNC: 104 MG/DL
HCT VFR BLD AUTO: 32.5 %
HGB BLD-MCNC: 10.3 G/DL
IRON SATN MFR SERPL: 18 %
IRON SERPL-MCNC: 43 UG/DL
LYMPHOCYTES # BLD AUTO: 0.8 K/UL
LYMPHOCYTES NFR BLD: 10.3 %
MCH RBC QN AUTO: 26 PG
MCHC RBC AUTO-ENTMCNC: 31.7 G/DL
MCV RBC AUTO: 82 FL
MONOCYTES # BLD AUTO: 0.6 K/UL
MONOCYTES NFR BLD: 8.5 %
NEUTROPHILS # BLD AUTO: 5.6 K/UL
NEUTROPHILS NFR BLD: 74.5 %
NRBC BLD-RTO: 0 /100 WBC
PLATELET # BLD AUTO: 236 K/UL
PMV BLD AUTO: 8.5 FL
POTASSIUM SERPL-SCNC: 3.7 MMOL/L
PROT SERPL-MCNC: 6.6 G/DL
RBC # BLD AUTO: 3.96 M/UL
SODIUM SERPL-SCNC: 133 MMOL/L
TOTAL IRON BINDING CAPACITY: 239 UG/DL
WBC # BLD AUTO: 7.45 K/UL

## 2018-09-27 PROCEDURE — 83540 ASSAY OF IRON: CPT

## 2018-09-27 PROCEDURE — 85025 COMPLETE CBC W/AUTO DIFF WBC: CPT

## 2018-09-27 PROCEDURE — 84153 ASSAY OF PSA TOTAL: CPT

## 2018-09-27 PROCEDURE — 36415 COLL VENOUS BLD VENIPUNCTURE: CPT | Mod: PN

## 2018-09-27 PROCEDURE — 82728 ASSAY OF FERRITIN: CPT | Mod: PN

## 2018-09-27 PROCEDURE — 80053 COMPREHEN METABOLIC PANEL: CPT | Mod: PN

## 2018-09-27 PROCEDURE — 82728 ASSAY OF FERRITIN: CPT

## 2018-09-27 PROCEDURE — 85025 COMPLETE CBC W/AUTO DIFF WBC: CPT | Mod: PN

## 2018-09-27 PROCEDURE — 83540 ASSAY OF IRON: CPT | Mod: PN

## 2018-09-27 PROCEDURE — 80053 COMPREHEN METABOLIC PANEL: CPT

## 2018-09-27 PROCEDURE — 84153 ASSAY OF PSA TOTAL: CPT | Mod: PN

## 2018-10-01 DIAGNOSIS — G50.0 TRIGEMINAL NEURALGIA OF RIGHT SIDE OF FACE: ICD-10-CM

## 2018-10-01 RX ORDER — OXCARBAZEPINE 300 MG/1
300 TABLET, FILM COATED ORAL 2 TIMES DAILY
Qty: 60 TABLET | Refills: 3 | Status: SHIPPED | OUTPATIENT
Start: 2018-10-01 | End: 2019-06-05 | Stop reason: SDUPTHER

## 2018-10-04 ENCOUNTER — LAB VISIT (OUTPATIENT)
Dept: LAB | Facility: HOSPITAL | Age: 73
End: 2018-10-04
Attending: PHYSICIAN ASSISTANT
Payer: MEDICARE

## 2018-10-04 DIAGNOSIS — C61 MALIGNANT NEOPLASM OF PROSTATE: ICD-10-CM

## 2018-10-04 LAB
BASOPHILS # BLD AUTO: 0.03 K/UL
BASOPHILS NFR BLD: 0.4 %
DIFFERENTIAL METHOD: ABNORMAL
EOSINOPHIL # BLD AUTO: 0.3 K/UL
EOSINOPHIL NFR BLD: 4.8 %
ERYTHROCYTE [DISTWIDTH] IN BLOOD BY AUTOMATED COUNT: 20.7 %
HCT VFR BLD AUTO: 32.5 %
HGB BLD-MCNC: 10.5 G/DL
LYMPHOCYTES # BLD AUTO: 0.8 K/UL
LYMPHOCYTES NFR BLD: 11.7 %
MCH RBC QN AUTO: 26.6 PG
MCHC RBC AUTO-ENTMCNC: 32.3 G/DL
MCV RBC AUTO: 83 FL
MONOCYTES # BLD AUTO: 0.7 K/UL
MONOCYTES NFR BLD: 9.5 %
NEUTROPHILS # BLD AUTO: 5.1 K/UL
NEUTROPHILS NFR BLD: 73.6 %
NRBC BLD-RTO: 0 /100 WBC
PLATELET # BLD AUTO: 247 K/UL
PMV BLD AUTO: 8.9 FL
RBC # BLD AUTO: 3.94 M/UL
WBC # BLD AUTO: 6.92 K/UL

## 2018-10-04 PROCEDURE — 85025 COMPLETE CBC W/AUTO DIFF WBC: CPT

## 2018-10-04 PROCEDURE — 85025 COMPLETE CBC W/AUTO DIFF WBC: CPT | Mod: PN

## 2018-10-04 PROCEDURE — 36415 COLL VENOUS BLD VENIPUNCTURE: CPT | Mod: PN

## 2018-10-18 ENCOUNTER — LAB VISIT (OUTPATIENT)
Dept: LAB | Facility: HOSPITAL | Age: 73
End: 2018-10-18
Attending: INTERNAL MEDICINE
Payer: MEDICARE

## 2018-10-18 DIAGNOSIS — C61 MALIGNANT NEOPLASM OF PROSTATE: ICD-10-CM

## 2018-10-18 LAB
ALBUMIN SERPL BCP-MCNC: 3.8 G/DL
ALP SERPL-CCNC: 93 U/L
ALT SERPL W/O P-5'-P-CCNC: 11 U/L
ANION GAP SERPL CALC-SCNC: 11 MMOL/L
AST SERPL-CCNC: 14 U/L
BASOPHILS # BLD AUTO: 0.03 K/UL
BASOPHILS NFR BLD: 0.5 %
BILIRUB SERPL-MCNC: 0.3 MG/DL
BUN SERPL-MCNC: 16 MG/DL
CALCIUM SERPL-MCNC: 9.2 MG/DL
CHLORIDE SERPL-SCNC: 100 MMOL/L
CO2 SERPL-SCNC: 25 MMOL/L
CREAT SERPL-MCNC: 1.07 MG/DL
DIFFERENTIAL METHOD: ABNORMAL
EOSINOPHIL # BLD AUTO: 0.3 K/UL
EOSINOPHIL NFR BLD: 5.4 %
ERYTHROCYTE [DISTWIDTH] IN BLOOD BY AUTOMATED COUNT: 19.9 %
EST. GFR  (AFRICAN AMERICAN): >60 ML/MIN/1.73 M^2
EST. GFR  (NON AFRICAN AMERICAN): >60 ML/MIN/1.73 M^2
GLUCOSE SERPL-MCNC: 150 MG/DL
HCT VFR BLD AUTO: 35.2 %
HGB BLD-MCNC: 10.9 G/DL
LYMPHOCYTES # BLD AUTO: 1 K/UL
LYMPHOCYTES NFR BLD: 16.3 %
MCH RBC QN AUTO: 26.6 PG
MCHC RBC AUTO-ENTMCNC: 31 G/DL
MCV RBC AUTO: 86 FL
MONOCYTES # BLD AUTO: 0.5 K/UL
MONOCYTES NFR BLD: 7.8 %
NEUTROPHILS # BLD AUTO: 4.1 K/UL
NEUTROPHILS NFR BLD: 70 %
NRBC BLD-RTO: 0 /100 WBC
PLATELET # BLD AUTO: 228 K/UL
PMV BLD AUTO: 8.8 FL
POTASSIUM SERPL-SCNC: 3.7 MMOL/L
PROT SERPL-MCNC: 6.6 G/DL
RBC # BLD AUTO: 4.1 M/UL
SODIUM SERPL-SCNC: 136 MMOL/L
WBC # BLD AUTO: 5.89 K/UL

## 2018-10-18 PROCEDURE — 85025 COMPLETE CBC W/AUTO DIFF WBC: CPT | Mod: PN

## 2018-10-18 PROCEDURE — 85025 COMPLETE CBC W/AUTO DIFF WBC: CPT

## 2018-10-18 PROCEDURE — 80053 COMPREHEN METABOLIC PANEL: CPT | Mod: PN

## 2018-10-18 PROCEDURE — 36415 COLL VENOUS BLD VENIPUNCTURE: CPT | Mod: PN

## 2018-10-18 PROCEDURE — 80053 COMPREHEN METABOLIC PANEL: CPT

## 2018-11-02 ENCOUNTER — LAB VISIT (OUTPATIENT)
Dept: LAB | Facility: HOSPITAL | Age: 73
End: 2018-11-02
Attending: INTERNAL MEDICINE
Payer: MEDICARE

## 2018-11-02 DIAGNOSIS — D47.2 MGUS (MONOCLONAL GAMMOPATHY OF UNKNOWN SIGNIFICANCE): ICD-10-CM

## 2018-11-02 DIAGNOSIS — C61 MALIGNANT NEOPLASM OF PROSTATE: ICD-10-CM

## 2018-11-02 DIAGNOSIS — D63.8 ANEMIA, CHRONIC DISEASE: ICD-10-CM

## 2018-11-02 LAB
ALBUMIN SERPL BCP-MCNC: 3.9 G/DL
ALP SERPL-CCNC: 85 U/L
ALT SERPL W/O P-5'-P-CCNC: 13 U/L
ANION GAP SERPL CALC-SCNC: 10 MMOL/L
AST SERPL-CCNC: 13 U/L
BASOPHILS # BLD AUTO: 0.04 K/UL
BASOPHILS NFR BLD: 0.7 %
BILIRUB SERPL-MCNC: 0.4 MG/DL
BUN SERPL-MCNC: 13 MG/DL
CALCIUM SERPL-MCNC: 9.4 MG/DL
CHLORIDE SERPL-SCNC: 102 MMOL/L
CO2 SERPL-SCNC: 25 MMOL/L
COMPLEXED PSA SERPL-MCNC: 1.6 NG/ML
CREAT SERPL-MCNC: 1.01 MG/DL
DIFFERENTIAL METHOD: ABNORMAL
EOSINOPHIL # BLD AUTO: 0.2 K/UL
EOSINOPHIL NFR BLD: 3.8 %
ERYTHROCYTE [DISTWIDTH] IN BLOOD BY AUTOMATED COUNT: 18 %
EST. GFR  (AFRICAN AMERICAN): >60 ML/MIN/1.73 M^2
EST. GFR  (NON AFRICAN AMERICAN): >60 ML/MIN/1.73 M^2
GLUCOSE SERPL-MCNC: 101 MG/DL
HCT VFR BLD AUTO: 34.9 %
HGB BLD-MCNC: 11.2 G/DL
LYMPHOCYTES # BLD AUTO: 0.8 K/UL
LYMPHOCYTES NFR BLD: 14 %
MCH RBC QN AUTO: 28.1 PG
MCHC RBC AUTO-ENTMCNC: 32.1 G/DL
MCV RBC AUTO: 88 FL
MONOCYTES # BLD AUTO: 0.6 K/UL
MONOCYTES NFR BLD: 10.4 %
NEUTROPHILS # BLD AUTO: 4.2 K/UL
NEUTROPHILS NFR BLD: 71.1 %
NRBC BLD-RTO: 0 /100 WBC
PLATELET # BLD AUTO: 227 K/UL
PMV BLD AUTO: 9.2 FL
POTASSIUM SERPL-SCNC: 3.7 MMOL/L
PROT SERPL-MCNC: 6.9 G/DL
RBC # BLD AUTO: 3.98 M/UL
SODIUM SERPL-SCNC: 137 MMOL/L
WBC # BLD AUTO: 5.86 K/UL

## 2018-11-02 PROCEDURE — 85025 COMPLETE CBC W/AUTO DIFF WBC: CPT

## 2018-11-02 PROCEDURE — 84153 ASSAY OF PSA TOTAL: CPT

## 2018-11-02 PROCEDURE — 80053 COMPREHEN METABOLIC PANEL: CPT | Mod: PN

## 2018-11-02 PROCEDURE — 36415 COLL VENOUS BLD VENIPUNCTURE: CPT | Mod: PN

## 2018-11-02 PROCEDURE — 84153 ASSAY OF PSA TOTAL: CPT | Mod: PN

## 2018-11-02 PROCEDURE — 80053 COMPREHEN METABOLIC PANEL: CPT

## 2018-11-02 PROCEDURE — 85025 COMPLETE CBC W/AUTO DIFF WBC: CPT | Mod: PN

## 2018-11-20 PROBLEM — Z85.038 HISTORY OF COLON CANCER: Status: ACTIVE | Noted: 2018-11-20

## 2018-11-29 PROBLEM — N13.30 HYDRONEPHROSIS: Status: ACTIVE | Noted: 2018-11-29

## 2018-11-30 ENCOUNTER — DOCUMENTATION ONLY (OUTPATIENT)
Dept: INFUSION THERAPY | Facility: HOSPITAL | Age: 73
End: 2018-11-30

## 2018-11-30 NOTE — PROGRESS NOTES
Patient's wife cancel all procrit appointments she will call us due to patient's levels are to good to get procrit in December She wants January's appointment same day as Dr verma

## 2018-12-28 ENCOUNTER — LAB VISIT (OUTPATIENT)
Dept: LAB | Facility: HOSPITAL | Age: 73
End: 2018-12-28
Attending: PHYSICIAN ASSISTANT
Payer: MEDICARE

## 2018-12-28 DIAGNOSIS — C61 MALIGNANT NEOPLASM OF PROSTATE: ICD-10-CM

## 2018-12-28 LAB
ALBUMIN SERPL BCP-MCNC: 3.9 G/DL
ALP SERPL-CCNC: 79 U/L
ALT SERPL W/O P-5'-P-CCNC: 17 U/L
ANION GAP SERPL CALC-SCNC: 10 MMOL/L
AST SERPL-CCNC: 14 U/L
BASOPHILS # BLD AUTO: 0.03 K/UL
BASOPHILS NFR BLD: 0.7 %
BILIRUB SERPL-MCNC: 0.4 MG/DL
BUN SERPL-MCNC: 18 MG/DL
CALCIUM SERPL-MCNC: 9.3 MG/DL
CHLORIDE SERPL-SCNC: 103 MMOL/L
CO2 SERPL-SCNC: 25 MMOL/L
COMPLEXED PSA SERPL-MCNC: 1.9 NG/ML
CREAT SERPL-MCNC: 1.02 MG/DL
DIFFERENTIAL METHOD: ABNORMAL
EOSINOPHIL # BLD AUTO: 0.2 K/UL
EOSINOPHIL NFR BLD: 3.5 %
ERYTHROCYTE [DISTWIDTH] IN BLOOD BY AUTOMATED COUNT: 14.5 %
EST. GFR  (AFRICAN AMERICAN): >60 ML/MIN/1.73 M^2
EST. GFR  (NON AFRICAN AMERICAN): >60 ML/MIN/1.73 M^2
GLUCOSE SERPL-MCNC: 138 MG/DL
HCT VFR BLD AUTO: 35.5 %
HGB BLD-MCNC: 11.6 G/DL
LYMPHOCYTES # BLD AUTO: 0.7 K/UL
LYMPHOCYTES NFR BLD: 15.5 %
MCH RBC QN AUTO: 29.4 PG
MCHC RBC AUTO-ENTMCNC: 32.7 G/DL
MCV RBC AUTO: 90 FL
MONOCYTES # BLD AUTO: 0.3 K/UL
MONOCYTES NFR BLD: 7.3 %
NEUTROPHILS # BLD AUTO: 3.3 K/UL
NEUTROPHILS NFR BLD: 73 %
NRBC BLD-RTO: 0 /100 WBC
PLATELET # BLD AUTO: 209 K/UL
PMV BLD AUTO: 9.6 FL
POTASSIUM SERPL-SCNC: 3.9 MMOL/L
PROT SERPL-MCNC: 6.9 G/DL
RBC # BLD AUTO: 3.94 M/UL
SODIUM SERPL-SCNC: 138 MMOL/L
WBC # BLD AUTO: 4.52 K/UL

## 2018-12-28 PROCEDURE — 85025 COMPLETE CBC W/AUTO DIFF WBC: CPT

## 2018-12-28 PROCEDURE — 80053 COMPREHEN METABOLIC PANEL: CPT | Mod: PN

## 2018-12-28 PROCEDURE — 80053 COMPREHEN METABOLIC PANEL: CPT

## 2018-12-28 PROCEDURE — 85025 COMPLETE CBC W/AUTO DIFF WBC: CPT | Mod: PN

## 2018-12-28 PROCEDURE — 84153 ASSAY OF PSA TOTAL: CPT

## 2018-12-28 PROCEDURE — 84153 ASSAY OF PSA TOTAL: CPT | Mod: PN

## 2018-12-28 PROCEDURE — 36415 COLL VENOUS BLD VENIPUNCTURE: CPT | Mod: PN

## 2019-01-24 ENCOUNTER — LAB VISIT (OUTPATIENT)
Dept: LAB | Facility: HOSPITAL | Age: 74
End: 2019-01-24
Attending: PHYSICIAN ASSISTANT
Payer: MEDICARE

## 2019-01-24 DIAGNOSIS — C61 MALIGNANT NEOPLASM OF PROSTATE: ICD-10-CM

## 2019-01-24 LAB
ALBUMIN SERPL BCP-MCNC: 3.7 G/DL
ALP SERPL-CCNC: 75 U/L
ALT SERPL W/O P-5'-P-CCNC: 15 U/L
ANION GAP SERPL CALC-SCNC: 8 MMOL/L
AST SERPL-CCNC: 10 U/L
BASOPHILS # BLD AUTO: 0.03 K/UL
BASOPHILS NFR BLD: 0.4 %
BILIRUB SERPL-MCNC: 0.5 MG/DL
BUN SERPL-MCNC: 15 MG/DL
CALCIUM SERPL-MCNC: 9.1 MG/DL
CHLORIDE SERPL-SCNC: 99 MMOL/L
CO2 SERPL-SCNC: 28 MMOL/L
COMPLEXED PSA SERPL-MCNC: 1.5 NG/ML
CREAT SERPL-MCNC: 1.1 MG/DL
DIFFERENTIAL METHOD: ABNORMAL
EOSINOPHIL # BLD AUTO: 0.2 K/UL
EOSINOPHIL NFR BLD: 2.7 %
ERYTHROCYTE [DISTWIDTH] IN BLOOD BY AUTOMATED COUNT: 14.6 %
EST. GFR  (AFRICAN AMERICAN): >60 ML/MIN/1.73 M^2
EST. GFR  (NON AFRICAN AMERICAN): >60 ML/MIN/1.73 M^2
GLUCOSE SERPL-MCNC: 95 MG/DL
HCT VFR BLD AUTO: 32 %
HGB BLD-MCNC: 10.4 G/DL
LYMPHOCYTES # BLD AUTO: 0.8 K/UL
LYMPHOCYTES NFR BLD: 11.7 %
MCH RBC QN AUTO: 29.1 PG
MCHC RBC AUTO-ENTMCNC: 32.5 G/DL
MCV RBC AUTO: 89 FL
MONOCYTES # BLD AUTO: 0.8 K/UL
MONOCYTES NFR BLD: 12 %
NEUTROPHILS # BLD AUTO: 5.1 K/UL
NEUTROPHILS NFR BLD: 73.2 %
NRBC BLD-RTO: 0 /100 WBC
PLATELET # BLD AUTO: 246 K/UL
PMV BLD AUTO: 9 FL
POTASSIUM SERPL-SCNC: 4.4 MMOL/L
PROT SERPL-MCNC: 6.8 G/DL
RBC # BLD AUTO: 3.58 M/UL
SODIUM SERPL-SCNC: 135 MMOL/L
WBC # BLD AUTO: 7.02 K/UL

## 2019-01-24 PROCEDURE — 85025 COMPLETE CBC W/AUTO DIFF WBC: CPT

## 2019-01-24 PROCEDURE — 80053 COMPREHEN METABOLIC PANEL: CPT | Mod: PN

## 2019-01-24 PROCEDURE — 85025 COMPLETE CBC W/AUTO DIFF WBC: CPT | Mod: PN

## 2019-01-24 PROCEDURE — 80053 COMPREHEN METABOLIC PANEL: CPT

## 2019-01-24 PROCEDURE — 84153 ASSAY OF PSA TOTAL: CPT | Mod: PN

## 2019-01-24 PROCEDURE — 84153 ASSAY OF PSA TOTAL: CPT

## 2019-01-24 PROCEDURE — 36415 COLL VENOUS BLD VENIPUNCTURE: CPT | Mod: PN

## 2019-01-27 PROBLEM — T83.512A URINARY TRACT INFECTION ASSOCIATED WITH NEPHROSTOMY CATHETER: Status: ACTIVE | Noted: 2019-01-27

## 2019-01-27 PROBLEM — A41.9 SEPSIS: Status: ACTIVE | Noted: 2019-01-27

## 2019-01-27 PROBLEM — N39.0 URINARY TRACT INFECTION ASSOCIATED WITH NEPHROSTOMY CATHETER: Status: ACTIVE | Noted: 2019-01-27

## 2019-01-28 PROBLEM — R50.9 FEVER: Status: ACTIVE | Noted: 2018-09-14

## 2019-01-30 PROBLEM — L02.219 ABSCESS OF SUPRAPUBIC REGION: Status: ACTIVE | Noted: 2019-01-30

## 2019-02-11 ENCOUNTER — TELEPHONE (OUTPATIENT)
Dept: INFUSION THERAPY | Facility: HOSPITAL | Age: 74
End: 2019-02-11

## 2019-02-11 NOTE — TELEPHONE ENCOUNTER
----- Message from Diandra Hua sent at 2/11/2019  3:32 PM CST -----  Caller:  Anna Quinones    Callback number:     [phone]      Reason: Needs to reschedule.  Thanks!

## 2019-02-11 NOTE — TELEPHONE ENCOUNTER
Spoke with patient's wife she reschedule appointment for 02/20/19 due to they have another md appointment that day and wanted to make one trip. Appointments were adjusted

## 2019-02-19 ENCOUNTER — TELEPHONE (OUTPATIENT)
Dept: INFUSION THERAPY | Facility: HOSPITAL | Age: 74
End: 2019-02-19

## 2019-02-19 NOTE — TELEPHONE ENCOUNTER
----- Message from Latonia Jewell sent at 2/19/2019  9:49 AM CST -----  Caller: Patient's wife                  Callback number: [phone]                         Reason: Would like to move patient's procrit apt to next Tuesday; if possible. Please advise                 Thank you

## 2019-02-26 ENCOUNTER — LAB VISIT (OUTPATIENT)
Dept: LAB | Facility: HOSPITAL | Age: 74
End: 2019-02-26
Attending: PHYSICIAN ASSISTANT
Payer: MEDICARE

## 2019-02-26 DIAGNOSIS — C61 MALIGNANT NEOPLASM OF PROSTATE: ICD-10-CM

## 2019-02-26 LAB
ALBUMIN SERPL BCP-MCNC: 3.8 G/DL
ALP SERPL-CCNC: 92 U/L
ALT SERPL W/O P-5'-P-CCNC: 10 U/L
ANION GAP SERPL CALC-SCNC: 8 MMOL/L
AST SERPL-CCNC: 15 U/L
BASOPHILS # BLD AUTO: 0.03 K/UL
BASOPHILS NFR BLD: 0.4 %
BILIRUB SERPL-MCNC: 0.4 MG/DL
BUN SERPL-MCNC: 16 MG/DL
CALCIUM SERPL-MCNC: 9.2 MG/DL
CHLORIDE SERPL-SCNC: 100 MMOL/L
CO2 SERPL-SCNC: 27 MMOL/L
COMPLEXED PSA SERPL-MCNC: 1.2 NG/ML
CREAT SERPL-MCNC: 0.98 MG/DL
DIFFERENTIAL METHOD: ABNORMAL
EOSINOPHIL # BLD AUTO: 0.3 K/UL
EOSINOPHIL NFR BLD: 4.4 %
ERYTHROCYTE [DISTWIDTH] IN BLOOD BY AUTOMATED COUNT: 15.2 %
EST. GFR  (AFRICAN AMERICAN): >60 ML/MIN/1.73 M^2
EST. GFR  (NON AFRICAN AMERICAN): >60 ML/MIN/1.73 M^2
GLUCOSE SERPL-MCNC: 114 MG/DL
HCT VFR BLD AUTO: 35.7 %
HGB BLD-MCNC: 11.3 G/DL
IMM GRANULOCYTES # BLD AUTO: 0.03 K/UL
IMM GRANULOCYTES NFR BLD AUTO: 0.4 %
LYMPHOCYTES # BLD AUTO: 0.8 K/UL
LYMPHOCYTES NFR BLD: 11.4 %
MCH RBC QN AUTO: 27.6 PG
MCHC RBC AUTO-ENTMCNC: 31.7 G/DL
MCV RBC AUTO: 87 FL
MONOCYTES # BLD AUTO: 0.6 K/UL
MONOCYTES NFR BLD: 8.5 %
NEUTROPHILS # BLD AUTO: 5.3 K/UL
NEUTROPHILS NFR BLD: 74.9 %
NRBC BLD-RTO: 0 /100 WBC
PLATELET # BLD AUTO: 248 K/UL
PMV BLD AUTO: 9.3 FL
POTASSIUM SERPL-SCNC: 4.2 MMOL/L
PROT SERPL-MCNC: 6.7 G/DL
RBC # BLD AUTO: 4.1 M/UL
SODIUM SERPL-SCNC: 135 MMOL/L
WBC # BLD AUTO: 7.08 K/UL

## 2019-02-26 PROCEDURE — 80053 COMPREHEN METABOLIC PANEL: CPT | Mod: PN

## 2019-02-26 PROCEDURE — 85025 COMPLETE CBC W/AUTO DIFF WBC: CPT | Mod: PN

## 2019-02-26 PROCEDURE — 84153 ASSAY OF PSA TOTAL: CPT | Mod: PN

## 2019-02-26 PROCEDURE — 84153 ASSAY OF PSA TOTAL: CPT

## 2019-02-26 PROCEDURE — 80053 COMPREHEN METABOLIC PANEL: CPT

## 2019-02-26 PROCEDURE — 36415 COLL VENOUS BLD VENIPUNCTURE: CPT | Mod: PN

## 2019-02-26 PROCEDURE — 85025 COMPLETE CBC W/AUTO DIFF WBC: CPT

## 2019-03-26 ENCOUNTER — LAB VISIT (OUTPATIENT)
Dept: LAB | Facility: HOSPITAL | Age: 74
End: 2019-03-26
Attending: PHYSICIAN ASSISTANT
Payer: MEDICARE

## 2019-03-26 DIAGNOSIS — C61 MALIGNANT NEOPLASM OF PROSTATE: ICD-10-CM

## 2019-03-26 DIAGNOSIS — D47.2 MGUS (MONOCLONAL GAMMOPATHY OF UNKNOWN SIGNIFICANCE): ICD-10-CM

## 2019-03-26 DIAGNOSIS — D63.8 ANEMIA, CHRONIC DISEASE: ICD-10-CM

## 2019-03-26 LAB
ALBUMIN SERPL BCP-MCNC: 3.5 G/DL (ref 3.5–5.2)
ALP SERPL-CCNC: 96 U/L (ref 38–145)
ALT SERPL W/O P-5'-P-CCNC: 8 U/L (ref 10–44)
ANION GAP SERPL CALC-SCNC: 8 MMOL/L (ref 8–16)
AST SERPL-CCNC: 17 U/L (ref 17–59)
BASOPHILS # BLD AUTO: 0.04 K/UL (ref 0–0.2)
BASOPHILS NFR BLD: 0.6 % (ref 0–1.9)
BILIRUB SERPL-MCNC: 0.3 MG/DL (ref 0.2–1.3)
BUN SERPL-MCNC: 14 MG/DL (ref 9–21)
CALCIUM SERPL-MCNC: 8.6 MG/DL (ref 8.4–10.2)
CHLORIDE SERPL-SCNC: 100 MMOL/L (ref 95–110)
CO2 SERPL-SCNC: 27 MMOL/L (ref 22–31)
COMPLEXED PSA SERPL-MCNC: 1.9 NG/ML (ref 0–4)
CREAT SERPL-MCNC: 1.06 MG/DL (ref 0.5–1.4)
DIFFERENTIAL METHOD: ABNORMAL
EOSINOPHIL # BLD AUTO: 0.2 K/UL (ref 0–0.5)
EOSINOPHIL NFR BLD: 2.5 % (ref 0–8)
ERYTHROCYTE [DISTWIDTH] IN BLOOD BY AUTOMATED COUNT: 15.7 % (ref 11.5–14.5)
EST. GFR  (AFRICAN AMERICAN): >60 ML/MIN/1.73 M^2
EST. GFR  (NON AFRICAN AMERICAN): >60 ML/MIN/1.73 M^2
GLUCOSE SERPL-MCNC: 94 MG/DL (ref 70–110)
HCT VFR BLD AUTO: 32.5 % (ref 40–54)
HGB BLD-MCNC: 10.5 G/DL (ref 14–18)
IMM GRANULOCYTES # BLD AUTO: 0.04 K/UL (ref 0–0.04)
IMM GRANULOCYTES NFR BLD AUTO: 0.6 % (ref 0–0.5)
LYMPHOCYTES # BLD AUTO: 0.8 K/UL (ref 1–4.8)
LYMPHOCYTES NFR BLD: 12.2 % (ref 18–48)
MCH RBC QN AUTO: 28.2 PG (ref 27–31)
MCHC RBC AUTO-ENTMCNC: 32.3 G/DL (ref 32–36)
MCV RBC AUTO: 87 FL (ref 82–98)
MONOCYTES # BLD AUTO: 0.6 K/UL (ref 0.3–1)
MONOCYTES NFR BLD: 9.3 % (ref 4–15)
NEUTROPHILS # BLD AUTO: 5.1 K/UL (ref 1.8–7.7)
NEUTROPHILS NFR BLD: 74.8 % (ref 38–73)
NRBC BLD-RTO: 0 /100 WBC
PLATELET # BLD AUTO: 256 K/UL (ref 150–350)
PMV BLD AUTO: 9.2 FL (ref 9.2–12.9)
POTASSIUM SERPL-SCNC: 4 MMOL/L (ref 3.5–5.1)
PROT SERPL-MCNC: 6.4 G/DL (ref 6–8.4)
RBC # BLD AUTO: 3.73 M/UL (ref 4.6–6.2)
SODIUM SERPL-SCNC: 135 MMOL/L (ref 136–145)
WBC # BLD AUTO: 6.81 K/UL (ref 3.9–12.7)

## 2019-03-26 PROCEDURE — 85025 COMPLETE CBC W/AUTO DIFF WBC: CPT

## 2019-03-26 PROCEDURE — 84153 ASSAY OF PSA TOTAL: CPT

## 2019-03-26 PROCEDURE — 80053 COMPREHEN METABOLIC PANEL: CPT | Mod: PN

## 2019-03-26 PROCEDURE — 85025 COMPLETE CBC W/AUTO DIFF WBC: CPT | Mod: PN

## 2019-03-26 PROCEDURE — 80053 COMPREHEN METABOLIC PANEL: CPT

## 2019-03-26 PROCEDURE — 36415 COLL VENOUS BLD VENIPUNCTURE: CPT | Mod: PN

## 2019-03-26 PROCEDURE — 84153 ASSAY OF PSA TOTAL: CPT | Mod: PN

## 2019-04-23 ENCOUNTER — LAB VISIT (OUTPATIENT)
Dept: LAB | Facility: HOSPITAL | Age: 74
End: 2019-04-23
Attending: PHYSICIAN ASSISTANT
Payer: MEDICARE

## 2019-04-23 DIAGNOSIS — C61 MALIGNANT NEOPLASM OF PROSTATE: ICD-10-CM

## 2019-04-23 LAB
ALBUMIN SERPL BCP-MCNC: 3.4 G/DL (ref 3.5–5.2)
ALP SERPL-CCNC: 87 U/L (ref 38–145)
ALT SERPL W/O P-5'-P-CCNC: 10 U/L (ref 10–44)
ANION GAP SERPL CALC-SCNC: 8 MMOL/L (ref 8–16)
AST SERPL-CCNC: 13 U/L (ref 17–59)
BASOPHILS # BLD AUTO: 0.03 K/UL (ref 0–0.2)
BASOPHILS NFR BLD: 0.6 % (ref 0–1.9)
BILIRUB SERPL-MCNC: 0.4 MG/DL (ref 0.2–1.3)
BUN SERPL-MCNC: 14 MG/DL (ref 9–21)
CALCIUM SERPL-MCNC: 8.9 MG/DL (ref 8.4–10.2)
CHLORIDE SERPL-SCNC: 104 MMOL/L (ref 95–110)
CO2 SERPL-SCNC: 25 MMOL/L (ref 22–31)
COMPLEXED PSA SERPL-MCNC: 2.1 NG/ML (ref 0–4)
CREAT SERPL-MCNC: 0.94 MG/DL (ref 0.5–1.4)
DIFFERENTIAL METHOD: ABNORMAL
EOSINOPHIL # BLD AUTO: 0.2 K/UL (ref 0–0.5)
EOSINOPHIL NFR BLD: 3.4 % (ref 0–8)
ERYTHROCYTE [DISTWIDTH] IN BLOOD BY AUTOMATED COUNT: 15.7 % (ref 11.5–14.5)
EST. GFR  (AFRICAN AMERICAN): >60 ML/MIN/1.73 M^2
EST. GFR  (NON AFRICAN AMERICAN): >60 ML/MIN/1.73 M^2
GLUCOSE SERPL-MCNC: 98 MG/DL (ref 70–110)
HCT VFR BLD AUTO: 32.3 % (ref 40–54)
HGB BLD-MCNC: 10.3 G/DL (ref 14–18)
IMM GRANULOCYTES # BLD AUTO: 0.01 K/UL (ref 0–0.04)
IMM GRANULOCYTES NFR BLD AUTO: 0.2 % (ref 0–0.5)
LYMPHOCYTES # BLD AUTO: 0.8 K/UL (ref 1–4.8)
LYMPHOCYTES NFR BLD: 14.9 % (ref 18–48)
MCH RBC QN AUTO: 27.8 PG (ref 27–31)
MCHC RBC AUTO-ENTMCNC: 31.9 G/DL (ref 32–36)
MCV RBC AUTO: 87 FL (ref 82–98)
MONOCYTES # BLD AUTO: 0.6 K/UL (ref 0.3–1)
MONOCYTES NFR BLD: 11.6 % (ref 4–15)
NEUTROPHILS # BLD AUTO: 3.7 K/UL (ref 1.8–7.7)
NEUTROPHILS NFR BLD: 69.3 % (ref 38–73)
NRBC BLD-RTO: 0 /100 WBC
PLATELET # BLD AUTO: 245 K/UL (ref 150–350)
PMV BLD AUTO: 9.3 FL (ref 9.2–12.9)
POTASSIUM SERPL-SCNC: 3.9 MMOL/L (ref 3.5–5.1)
PROT SERPL-MCNC: 6.2 G/DL (ref 6–8.4)
RBC # BLD AUTO: 3.71 M/UL (ref 4.6–6.2)
SODIUM SERPL-SCNC: 137 MMOL/L (ref 136–145)
WBC # BLD AUTO: 5.36 K/UL (ref 3.9–12.7)

## 2019-04-23 PROCEDURE — 85025 COMPLETE CBC W/AUTO DIFF WBC: CPT

## 2019-04-23 PROCEDURE — 84153 ASSAY OF PSA TOTAL: CPT

## 2019-04-23 PROCEDURE — 80053 COMPREHEN METABOLIC PANEL: CPT

## 2019-04-23 PROCEDURE — 85025 COMPLETE CBC W/AUTO DIFF WBC: CPT | Mod: PN

## 2019-04-23 PROCEDURE — 36415 COLL VENOUS BLD VENIPUNCTURE: CPT | Mod: PN

## 2019-04-23 PROCEDURE — 80053 COMPREHEN METABOLIC PANEL: CPT | Mod: PN

## 2019-04-23 PROCEDURE — 84153 ASSAY OF PSA TOTAL: CPT | Mod: PN

## 2019-05-27 ENCOUNTER — LAB VISIT (OUTPATIENT)
Dept: LAB | Facility: HOSPITAL | Age: 74
End: 2019-05-27
Attending: INTERNAL MEDICINE
Payer: MEDICARE

## 2019-05-27 DIAGNOSIS — C61 MALIGNANT NEOPLASM OF PROSTATE: ICD-10-CM

## 2019-05-27 LAB
ALBUMIN SERPL BCP-MCNC: 3.6 G/DL (ref 3.5–5.2)
ALP SERPL-CCNC: 84 U/L (ref 38–145)
ALT SERPL W/O P-5'-P-CCNC: 12 U/L (ref 10–44)
ANION GAP SERPL CALC-SCNC: 7 MMOL/L (ref 8–16)
AST SERPL-CCNC: 15 U/L (ref 17–59)
BASOPHILS # BLD AUTO: 0.03 K/UL (ref 0–0.2)
BASOPHILS NFR BLD: 0.5 % (ref 0–1.9)
BILIRUB SERPL-MCNC: 0.5 MG/DL (ref 0.2–1.3)
BUN SERPL-MCNC: 18 MG/DL (ref 9–21)
CALCIUM SERPL-MCNC: 8.9 MG/DL (ref 8.4–10.2)
CHLORIDE SERPL-SCNC: 102 MMOL/L (ref 95–110)
CO2 SERPL-SCNC: 25 MMOL/L (ref 22–31)
COMPLEXED PSA SERPL-MCNC: 2.4 NG/ML (ref 0–4)
CREAT SERPL-MCNC: 1.03 MG/DL (ref 0.5–1.4)
DIFFERENTIAL METHOD: ABNORMAL
EOSINOPHIL # BLD AUTO: 0.2 K/UL (ref 0–0.5)
EOSINOPHIL NFR BLD: 3 % (ref 0–8)
ERYTHROCYTE [DISTWIDTH] IN BLOOD BY AUTOMATED COUNT: 15.1 % (ref 11.5–14.5)
EST. GFR  (AFRICAN AMERICAN): >60 ML/MIN/1.73 M^2
EST. GFR  (NON AFRICAN AMERICAN): >60 ML/MIN/1.73 M^2
GLUCOSE SERPL-MCNC: 95 MG/DL (ref 70–110)
HCT VFR BLD AUTO: 32.5 % (ref 40–54)
HGB BLD-MCNC: 10.5 G/DL (ref 14–18)
IMM GRANULOCYTES # BLD AUTO: 0.03 K/UL (ref 0–0.04)
IMM GRANULOCYTES NFR BLD AUTO: 0.5 % (ref 0–0.5)
LYMPHOCYTES # BLD AUTO: 1 K/UL (ref 1–4.8)
LYMPHOCYTES NFR BLD: 16.5 % (ref 18–48)
MCH RBC QN AUTO: 27.1 PG (ref 27–31)
MCHC RBC AUTO-ENTMCNC: 32.3 G/DL (ref 32–36)
MCV RBC AUTO: 84 FL (ref 82–98)
MONOCYTES # BLD AUTO: 0.7 K/UL (ref 0.3–1)
MONOCYTES NFR BLD: 10.8 % (ref 4–15)
NEUTROPHILS # BLD AUTO: 4.1 K/UL (ref 1.8–7.7)
NEUTROPHILS NFR BLD: 68.7 % (ref 38–73)
NRBC BLD-RTO: 0 /100 WBC
PLATELET # BLD AUTO: 241 K/UL (ref 150–350)
PMV BLD AUTO: 9.5 FL (ref 9.2–12.9)
POTASSIUM SERPL-SCNC: 4.1 MMOL/L (ref 3.5–5.1)
PROT SERPL-MCNC: 6.3 G/DL (ref 6–8.4)
RBC # BLD AUTO: 3.88 M/UL (ref 4.6–6.2)
SODIUM SERPL-SCNC: 134 MMOL/L (ref 136–145)
WBC # BLD AUTO: 6.01 K/UL (ref 3.9–12.7)

## 2019-05-27 PROCEDURE — 36415 COLL VENOUS BLD VENIPUNCTURE: CPT | Mod: PN

## 2019-05-27 PROCEDURE — 84153 ASSAY OF PSA TOTAL: CPT

## 2019-05-27 PROCEDURE — 80053 COMPREHEN METABOLIC PANEL: CPT

## 2019-05-27 PROCEDURE — 85025 COMPLETE CBC W/AUTO DIFF WBC: CPT | Mod: PN

## 2019-05-27 PROCEDURE — 84153 ASSAY OF PSA TOTAL: CPT | Mod: PN

## 2019-05-27 PROCEDURE — 85025 COMPLETE CBC W/AUTO DIFF WBC: CPT

## 2019-05-27 PROCEDURE — 80053 COMPREHEN METABOLIC PANEL: CPT | Mod: PN

## 2019-06-05 DIAGNOSIS — G50.0 TRIGEMINAL NEURALGIA OF RIGHT SIDE OF FACE: ICD-10-CM

## 2019-06-05 RX ORDER — OXCARBAZEPINE 300 MG/1
300 TABLET, FILM COATED ORAL 2 TIMES DAILY
Qty: 60 TABLET | Refills: 11 | Status: SHIPPED | OUTPATIENT
Start: 2019-06-05

## 2019-06-05 NOTE — TELEPHONE ENCOUNTER
----- Message from Debby Bertrand sent at 6/5/2019  8:48 AM CDT -----   Type:  RX Refill Request    Who Called:  Pt wife fior  Refill RX Name and Strength: oxcarbazepine   300 mg  How is the patient currently taking it? (ex. 1XDay):1  Tab twice  daily  Is this a 30 day or 90 day RX: 30  days  Preferred Pharmacy with phone number:    [address] [address]  Phone: [phone] Fax: [phone]  Best Call Back Number: [phone]  Additional Information:  Pt  Is  Out  Of  med

## 2019-06-24 ENCOUNTER — LAB VISIT (OUTPATIENT)
Dept: LAB | Facility: HOSPITAL | Age: 74
End: 2019-06-24
Attending: PHYSICIAN ASSISTANT
Payer: MEDICARE

## 2019-06-24 DIAGNOSIS — C61 MALIGNANT NEOPLASM OF PROSTATE: ICD-10-CM

## 2019-06-24 LAB
ALBUMIN SERPL BCP-MCNC: 3.5 G/DL (ref 3.5–5.2)
ALP SERPL-CCNC: 76 U/L (ref 38–145)
ALT SERPL W/O P-5'-P-CCNC: 10 U/L (ref 10–44)
ANION GAP SERPL CALC-SCNC: 11 MMOL/L (ref 8–16)
AST SERPL-CCNC: 14 U/L (ref 17–59)
BASOPHILS # BLD AUTO: 0.03 K/UL (ref 0–0.2)
BASOPHILS NFR BLD: 0.5 % (ref 0–1.9)
BILIRUB SERPL-MCNC: 0.4 MG/DL (ref 0.2–1.3)
BUN SERPL-MCNC: 16 MG/DL (ref 9–21)
CALCIUM SERPL-MCNC: 8.8 MG/DL (ref 8.4–10.2)
CHLORIDE SERPL-SCNC: 97 MMOL/L (ref 95–110)
CO2 SERPL-SCNC: 25 MMOL/L (ref 22–31)
COMPLEXED PSA SERPL-MCNC: 2.2 NG/ML (ref 0–4)
CREAT SERPL-MCNC: 0.91 MG/DL (ref 0.5–1.4)
DIFFERENTIAL METHOD: ABNORMAL
EOSINOPHIL # BLD AUTO: 0.2 K/UL (ref 0–0.5)
EOSINOPHIL NFR BLD: 3.1 % (ref 0–8)
ERYTHROCYTE [DISTWIDTH] IN BLOOD BY AUTOMATED COUNT: 14.8 % (ref 11.5–14.5)
EST. GFR  (AFRICAN AMERICAN): >60 ML/MIN/1.73 M^2
EST. GFR  (NON AFRICAN AMERICAN): >60 ML/MIN/1.73 M^2
GLUCOSE SERPL-MCNC: 94 MG/DL (ref 70–110)
HCT VFR BLD AUTO: 31.8 % (ref 40–54)
HGB BLD-MCNC: 10.2 G/DL (ref 14–18)
IMM GRANULOCYTES # BLD AUTO: 0.02 K/UL (ref 0–0.04)
IMM GRANULOCYTES NFR BLD AUTO: 0.3 % (ref 0–0.5)
LYMPHOCYTES # BLD AUTO: 0.8 K/UL (ref 1–4.8)
LYMPHOCYTES NFR BLD: 14.4 % (ref 18–48)
MCH RBC QN AUTO: 26.7 PG (ref 27–31)
MCHC RBC AUTO-ENTMCNC: 32.1 G/DL (ref 32–36)
MCV RBC AUTO: 83 FL (ref 82–98)
MONOCYTES # BLD AUTO: 0.6 K/UL (ref 0.3–1)
MONOCYTES NFR BLD: 9.7 % (ref 4–15)
NEUTROPHILS # BLD AUTO: 4.2 K/UL (ref 1.8–7.7)
NEUTROPHILS NFR BLD: 72 % (ref 38–73)
NRBC BLD-RTO: 0 /100 WBC
PLATELET # BLD AUTO: 224 K/UL (ref 150–350)
PMV BLD AUTO: 9.6 FL (ref 9.2–12.9)
POTASSIUM SERPL-SCNC: 3.9 MMOL/L (ref 3.5–5.1)
PROT SERPL-MCNC: 6.1 G/DL (ref 6–8.4)
RBC # BLD AUTO: 3.82 M/UL (ref 4.6–6.2)
SODIUM SERPL-SCNC: 133 MMOL/L (ref 136–145)
WBC # BLD AUTO: 5.85 K/UL (ref 3.9–12.7)

## 2019-06-24 PROCEDURE — 84153 ASSAY OF PSA TOTAL: CPT

## 2019-06-24 PROCEDURE — 85025 COMPLETE CBC W/AUTO DIFF WBC: CPT

## 2019-06-24 PROCEDURE — 36415 COLL VENOUS BLD VENIPUNCTURE: CPT | Mod: PN

## 2019-06-24 PROCEDURE — 80053 COMPREHEN METABOLIC PANEL: CPT

## 2019-06-24 PROCEDURE — 84153 ASSAY OF PSA TOTAL: CPT | Mod: PN

## 2019-06-24 PROCEDURE — 80053 COMPREHEN METABOLIC PANEL: CPT | Mod: PN

## 2019-06-24 PROCEDURE — 85025 COMPLETE CBC W/AUTO DIFF WBC: CPT | Mod: PN

## 2019-07-24 ENCOUNTER — INFUSION (OUTPATIENT)
Dept: INFUSION THERAPY | Facility: HOSPITAL | Age: 74
End: 2019-07-24
Attending: INTERNAL MEDICINE
Payer: MEDICARE

## 2019-07-24 VITALS — RESPIRATION RATE: 20 BRPM | HEART RATE: 67 BPM | DIASTOLIC BLOOD PRESSURE: 68 MMHG | SYSTOLIC BLOOD PRESSURE: 121 MMHG

## 2019-07-24 DIAGNOSIS — C61 PROSTATE CANCER METASTATIC TO BONE: ICD-10-CM

## 2019-07-24 DIAGNOSIS — D63.8 ANEMIA, CHRONIC DISEASE: Primary | ICD-10-CM

## 2019-07-24 DIAGNOSIS — C79.51 PROSTATE CANCER METASTATIC TO BONE: ICD-10-CM

## 2019-07-24 PROCEDURE — 96372 THER/PROPH/DIAG INJ SC/IM: CPT | Mod: PN

## 2019-07-24 PROCEDURE — 63600175 PHARM REV CODE 636 W HCPCS: Mod: JG,PN | Performed by: PHYSICIAN ASSISTANT

## 2019-07-24 RX ORDER — HEPARIN 100 UNIT/ML
500 SYRINGE INTRAVENOUS
Status: CANCELLED | OUTPATIENT
Start: 2019-07-31

## 2019-07-24 RX ADMIN — ERYTHROPOIETIN 40000 UNITS: 40000 INJECTION, SOLUTION INTRAVENOUS; SUBCUTANEOUS at 09:07

## 2019-08-04 PROBLEM — N30.00 ACUTE CYSTITIS WITHOUT HEMATURIA: Status: ACTIVE | Noted: 2019-08-04

## 2019-08-06 PROBLEM — N39.0 UTI DUE TO KLEBSIELLA SPECIES: Status: ACTIVE | Noted: 2019-08-06

## 2019-08-06 PROBLEM — B96.89 UTI DUE TO KLEBSIELLA SPECIES: Status: ACTIVE | Noted: 2019-08-06

## 2019-08-09 PROBLEM — N32.89 BLADDER MASS: Status: ACTIVE | Noted: 2019-08-09

## 2019-08-11 PROBLEM — N10 ACUTE PYELONEPHRITIS: Status: ACTIVE | Noted: 2019-08-11

## 2019-08-11 PROBLEM — E87.1 HYPONATREMIA: Status: ACTIVE | Noted: 2019-08-11

## 2019-08-21 ENCOUNTER — LAB VISIT (OUTPATIENT)
Dept: LAB | Facility: HOSPITAL | Age: 74
End: 2019-08-21
Attending: PHYSICIAN ASSISTANT
Payer: MEDICARE

## 2019-08-21 DIAGNOSIS — C61 MALIGNANT NEOPLASM OF PROSTATE: ICD-10-CM

## 2019-08-21 LAB
ALBUMIN SERPL BCP-MCNC: 4 G/DL (ref 3.5–5.2)
ALP SERPL-CCNC: 99 U/L (ref 38–145)
ALT SERPL W/O P-5'-P-CCNC: 9 U/L (ref 10–44)
ANION GAP SERPL CALC-SCNC: 13 MMOL/L (ref 8–16)
AST SERPL-CCNC: 19 U/L (ref 17–59)
BASOPHILS # BLD AUTO: 0.04 K/UL (ref 0–0.2)
BASOPHILS NFR BLD: 0.6 % (ref 0–1.9)
BILIRUB SERPL-MCNC: 0.4 MG/DL (ref 0.2–1.3)
BUN SERPL-MCNC: 16 MG/DL (ref 9–21)
CALCIUM SERPL-MCNC: 9 MG/DL (ref 8.4–10.2)
CHLORIDE SERPL-SCNC: 91 MMOL/L (ref 95–110)
CO2 SERPL-SCNC: 23 MMOL/L (ref 22–31)
COMPLEXED PSA SERPL-MCNC: 2.4 NG/ML (ref 0–4)
CREAT SERPL-MCNC: 0.83 MG/DL (ref 0.5–1.4)
DIFFERENTIAL METHOD: ABNORMAL
EOSINOPHIL # BLD AUTO: 0.2 K/UL (ref 0–0.5)
EOSINOPHIL NFR BLD: 2.9 % (ref 0–8)
ERYTHROCYTE [DISTWIDTH] IN BLOOD BY AUTOMATED COUNT: 17.7 % (ref 11.5–14.5)
EST. GFR  (AFRICAN AMERICAN): >60 ML/MIN/1.73 M^2
EST. GFR  (NON AFRICAN AMERICAN): >60 ML/MIN/1.73 M^2
GLUCOSE SERPL-MCNC: 97 MG/DL (ref 70–110)
HCT VFR BLD AUTO: 31.9 % (ref 40–54)
HGB BLD-MCNC: 10.3 G/DL (ref 14–18)
IMM GRANULOCYTES # BLD AUTO: 0.04 K/UL (ref 0–0.04)
IMM GRANULOCYTES NFR BLD AUTO: 0.6 % (ref 0–0.5)
LYMPHOCYTES # BLD AUTO: 0.8 K/UL (ref 1–4.8)
LYMPHOCYTES NFR BLD: 12.6 % (ref 18–48)
MCH RBC QN AUTO: 26.3 PG (ref 27–31)
MCHC RBC AUTO-ENTMCNC: 32.3 G/DL (ref 32–36)
MCV RBC AUTO: 81 FL (ref 82–98)
MONOCYTES # BLD AUTO: 0.6 K/UL (ref 0.3–1)
MONOCYTES NFR BLD: 9.8 % (ref 4–15)
NEUTROPHILS # BLD AUTO: 4.6 K/UL (ref 1.8–7.7)
NEUTROPHILS NFR BLD: 73.5 % (ref 38–73)
NRBC BLD-RTO: 0 /100 WBC
PLATELET # BLD AUTO: 293 K/UL (ref 150–350)
PMV BLD AUTO: 8.7 FL (ref 9.2–12.9)
POTASSIUM SERPL-SCNC: 4.3 MMOL/L (ref 3.5–5.1)
PROT SERPL-MCNC: 6.9 G/DL (ref 6–8.4)
RBC # BLD AUTO: 3.92 M/UL (ref 4.6–6.2)
SODIUM SERPL-SCNC: 127 MMOL/L (ref 136–145)
WBC # BLD AUTO: 6.21 K/UL (ref 3.9–12.7)

## 2019-08-21 PROCEDURE — 84153 ASSAY OF PSA TOTAL: CPT

## 2019-08-21 PROCEDURE — 84153 ASSAY OF PSA TOTAL: CPT | Mod: PN

## 2019-08-21 PROCEDURE — 85025 COMPLETE CBC W/AUTO DIFF WBC: CPT

## 2019-08-21 PROCEDURE — 85025 COMPLETE CBC W/AUTO DIFF WBC: CPT | Mod: PN

## 2019-08-21 PROCEDURE — 80053 COMPREHEN METABOLIC PANEL: CPT | Mod: PN

## 2019-08-21 PROCEDURE — 36415 COLL VENOUS BLD VENIPUNCTURE: CPT | Mod: PN

## 2019-08-21 PROCEDURE — 80053 COMPREHEN METABOLIC PANEL: CPT

## 2019-08-28 ENCOUNTER — OFFICE VISIT (OUTPATIENT)
Dept: NEUROLOGY | Facility: CLINIC | Age: 74
End: 2019-08-28
Payer: MEDICARE

## 2019-08-28 VITALS
DIASTOLIC BLOOD PRESSURE: 71 MMHG | BODY MASS INDEX: 27.84 KG/M2 | HEIGHT: 71 IN | WEIGHT: 198.88 LBS | HEART RATE: 74 BPM | RESPIRATION RATE: 18 BRPM | SYSTOLIC BLOOD PRESSURE: 134 MMHG

## 2019-08-28 DIAGNOSIS — Z86.73 HISTORY OF STROKE: ICD-10-CM

## 2019-08-28 DIAGNOSIS — M79.601 RIGHT ARM PAIN: Primary | ICD-10-CM

## 2019-08-28 DIAGNOSIS — E78.2 MIXED HYPERLIPIDEMIA: ICD-10-CM

## 2019-08-28 DIAGNOSIS — G50.0 TRIGEMINAL NEURALGIA OF RIGHT SIDE OF FACE: ICD-10-CM

## 2019-08-28 PROCEDURE — 99999 PR PBB SHADOW E&M-EST. PATIENT-LVL IV: ICD-10-PCS | Mod: PBBFAC,,, | Performed by: PSYCHIATRY & NEUROLOGY

## 2019-08-28 PROCEDURE — 99214 PR OFFICE/OUTPT VISIT, EST, LEVL IV, 30-39 MIN: ICD-10-PCS | Mod: S$PBB,,, | Performed by: PSYCHIATRY & NEUROLOGY

## 2019-08-28 PROCEDURE — 99214 OFFICE O/P EST MOD 30 MIN: CPT | Mod: PBBFAC,PO | Performed by: PSYCHIATRY & NEUROLOGY

## 2019-08-28 PROCEDURE — 99214 OFFICE O/P EST MOD 30 MIN: CPT | Mod: S$PBB,,, | Performed by: PSYCHIATRY & NEUROLOGY

## 2019-08-28 PROCEDURE — 99999 PR PBB SHADOW E&M-EST. PATIENT-LVL IV: CPT | Mod: PBBFAC,,, | Performed by: PSYCHIATRY & NEUROLOGY

## 2019-08-28 RX ORDER — DICLOFENAC SODIUM 10 MG/G
2 GEL TOPICAL 4 TIMES DAILY PRN
Qty: 100 G | Refills: 11 | Status: SHIPPED | OUTPATIENT
Start: 2019-08-28

## 2019-08-28 NOTE — PATIENT INSTRUCTIONS
TESTING:  -- repeat lipid panel with your next labs (ordered)    PLAN:  -- START Voltaren gel for your right arm   -- continue rosuvastatin 20mg daily (for improved stroke protection given you had to stop aspirin)   -- continue Trileptal (oxcarbazepime) 300mg at night - OK to move to 300mg twice a day or 150/150/300mg as tolerated and if needed for pain   -- may need to add on a long acting opiate like Morphine to your Norco or change the Norco to Percocet  -- from the stroke standpoint, I am ok with you using prophylactic Lovenox during times of little mobility

## 2019-08-28 NOTE — PROGRESS NOTES
Date of service: 8/28/2019  Referring provider: No ref. provider found    Subjective:      Chief complaint: Stroke; Facial Pain; and Peripheral Neuropathy       Patient ID: Chester Quinones is a 74 y.o. 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 Quinones is here with his wife for routine follow-up.  At last visit we changed his simvastatin to receive a statin for secondary stroke prevention and continued his Trileptal for trigeminal neuralgia.    Sodium has been in the high 120s.  Anemia stable.  He has been having severe urethral pain and was recently found to have recurrent prostate cancer on PET and biopsy 6/27/19.     At this time his functional status is poor and he is bed-bound about 90% of the time.  His wife asks whether it is safe for him and whether he should be on prophylactic Lovenox treatments.    His trigeminal neuralgia has been under very good control and he is now only using 1 300 mg tablet of Trileptal before bed.    A couple weeks ago he began with pain in the right upper extremity, it involves the shoulder down to the elbow.  It is a deep ache.  There is no numbness.  There is no weakness.  There is no pain in his hand.  It improves with rest.  He has had some trauma to that area.    He is currently using Norco 10 mg 4 times a day and find that the duration of relief is too short.  This is managed by the oncology clinic.    INTERVAL HISTORY - 8/9/18     No medication changes were made from stroke or trigeminal neuralgia  "perspective at last visit 3 months ago. He had blood work yesterday showing stable hyponatremia and stable anemia (8.2 / 28)     In the interim he had lot of bleeding associated with the nephrostomy tube change last month and had to completely stop aspirin. He is anemic but this is stable. He is on procrit and will start iron infusions. His energy level is down significantly since this last event.     His trigeminal neuralgia has been compltely in remission on trileptal 150mg / 300mg.     INTERVAL HISTORY - 4/20/18    In the interim he had worse trigeminal neuralgia pain (right) and his confusion was better so we started trileptal - has been only able to tolerate 150mg in AM and 300mg in Pm. He does report the TN is much better though his wife says he cannot tolerate the full 300mg BID just yet.     Recently had event of hematuria from his nephrostomy tube - Right side had blood left side had pus. He was admitted this week after this for nephrostomy tube change and had to have ABX for UTI, and fluids for dehydration. He has been unstable since the hospital and using his cane voluntarily. He will now have the nephrostomy tube changed every 2 months instead of every 3 months. He is still not back to his physical baseline after this infection.     INTERVAL HISTORY - 3/22/18     Patient reports he is good but his wife reports his memory has been poor for the past 2-3 weeks, she is constantly having to repeat things to him. His trigeminal neuralgia (right) is back - nose, lip, eye. He does not describe this as a pain but as a "symptom, like a nerve, it is so minor." He fell 2 months ago, on the grass, hitting the left forehead, but no more recent head trauma. He was treated for a UTI one month ago, no recent fevers or chills or other systemic symptoms.     INTERVAL HISTORY - 12/22/17     I last saw patient in [facility] for consultation for stroke/TIA which I was concerned were due to cardioembolic source possible cancerous " "based on appearance. He had a negative ANTWAN in the hospital. He also had a EEG in the hospital because of recurrent episodes of confusion which was normal. I recommended Pradaxa for stroke prevention, this was deferred in the setting of bilateral nephrostomy tubes and suprapubic catheter which had previously been affected with bleeding.     He was discharged on full dose ASA 325mg, lisinopril 5mg and continued on simvastatin 40mg daily. His urologist feels he is very high risk for bleeding from the urological sources (has bled in the setting of NO thinners before).     Today they report she is doing better. His energy is better. His appetite has increased, gaining weight, walking without the assistance of walker. He is in PT and Ot. He has had no further TIAs. He is on a 30 day heart monitor - has has not had any events.     He is having nephrostomy tubes changes 1/4/17 and asks when to stop aspirin. He is having active hematuria in the suprapubic catheter which started in the hospital (he had this problem before aspirin as well).     ORIGINAL NEURO HISTORY - 12/3/17     Mr. Quinones has been feeling generally unwell for months, since his last episode of sepsis. He was undergoing an outpatient stress test with Dr. Molina on 11/30/17. On the drive back home, he had a 20 min episode of speaking "gibberish" and being very frustrated doing so. Even after his language disturbance resolved, his wife noticed he had a hard time walking. Later that day he had a headache. His wife wanted him to be checked out but he declined. On the morning of presentation, 12/1/17 he woke up at 5am. His wife said his gait was unsteady and it was unusual of him to request a wheelchair to the doctor's office. He went for a routine visit with his oncologist where he was noted to have slow speech and unsteady gait, and was recommended to go to the Ed. In the ED his vitals were 136/110, 72, afebrile and was found to have a left facial droop. " "     Mr. Quinones also reports having a "TIA" in feb 2017 during which he was confused for a couple of days but it does not seem that he had focal neurological findings. During this time he was on aspirin, and he had been on aspirin for years but in Aug 2017 stopped when he had bleeding from the right nephrostomy tube and had not gone back. He reports no other known neurological events. He feels tired.    UA was "dirty" but this is felt to be chronic.      HOSPITAL COURSE:     12/3/17: patient had ANTWAN today, awaiting results. His wife mentioned to me that during this hospitalization he has not seemed himself, specifically, having intermittent periods of confusion. She is very scared of brining him home.     Review of patient's allergies indicates:   Allergen Reactions    Niacin Rash    Ativan [lorazepam] Hallucinations     Current Outpatient Medications   Medication Sig Dispense Refill    acetaminophen (TYLENOL) 500 MG tablet Take 500 mg by mouth every 6 (six) hours as needed (headache).       cyanocobalamin, vitamin B-12, 5,000 mcg TbDL Take 5,000 mcg by mouth once daily.       diazePAM (VALIUM) 10 MG Tab Take 1 tablet (10 mg total) by mouth every 12 (twelve) hours. Takes 5 mg am and 10 mg pm (Patient taking differently: Take 5mg (1/2 pill) by mouth in the morning and  10mg (1 pill) by mouth in the evening) 60 tablet 3    ethyl alcohol (ALCOHOLIC) Soln liquid Take 240 mLs by mouth every other day.       FLUoxetine (PROZAC) 20 MG capsule Take 1 capsule (20 mg total) by mouth once daily. 30 capsule 6    folic acid (FOLVITE) 400 MCG tablet Take 400 mcg by mouth once daily.      gabapentin (NEURONTIN) 600 MG tablet Take 600 mg by mouth 3 (three) times daily.       HYDROcodone-acetaminophen (NORCO)  mg per tablet Take 1/2 to 1 tab every 4 hours prn for pain 120 tablet 0    leuprolide acetate (LEUPROLIDE, 3 MONTH, SUBQ) Inject 45 mg into the skin every 3 (three) months. Next injection due: 8/2019      " losartan (COZAAR) 25 MG tablet Take 25 mg by mouth once daily.      OXcarbazepine (TRILEPTAL) 300 MG Tab Take 1 tablet (300 mg total) by mouth 2 (two) times daily. 60 tablet 11    oxybutynin (DITROPAN) 5 MG Tab Take 5 mg by mouth 2 (two) times daily.      temazepam (RESTORIL) 15 mg Cap Take 1 capsule (15 mg total) by mouth nightly as needed. 30 capsule 3    XTANDI 40 mg Cap Take 4 capsules (160mg) by mouth once daily as directed by physician. 120 capsule 4    diclofenac sodium (VOLTAREN) 1 % Gel Apply 2 g topically 4 (four) times daily as needed. For pain 100 g 11    rosuvastatin (CRESTOR) 20 MG tablet Take 1 tablet (20 mg total) by mouth once daily. 30 tablet 11     No current facility-administered medications for this visit.      Facility-Administered Medications Ordered in Other Visits   Medication Dose Route Frequency Provider Last Rate Last Dose    lactated ringers infusion   Intravenous Continuous Adalid Nolasco MD        lidocaine (PF) 10 mg/ml (1%) injection 10 mg  1 mL Other Once Adalid Nolasco MD           Past Medical History  Past Medical History:   Diagnosis Date    Anemia     wife stated he bleeds from right kidney    Aneurysm     aneurysm of both kidneys    Aneurysm artery, iliac     bilateral    Aneurysm of aorta     x3    Arthritis     Asbestosis     Cataract     Colorectal cancer 2015    COPD (chronic obstructive pulmonary disease)     Coronary artery disease     stent x 1    Encounter for blood transfusion     History of suprapubic catheter     Hyperlipidemia     Hypertension     Incontinence     Kidney insufficiency     Myocardial infarction     Neuropathy     Prostate cancer 2005    and in 2010    Radiation injury     after prostate cancer treatment    Restless leg     Septic shock     Stroke     12/1/17    TIA (transient ischemic attack) 2016    Trigeminal neuralgia     to face & neck       Past Surgical History  Past Surgical History:   Procedure  Laterality Date    APPENDECTOMY      change  Nephrostomy, Percutaneous in cath lab N/A 6/10/2019    [facility] C[facility]    CHANGE, Nephrostomy, Percutaneous in cath lab- RM # [id number] N/A 8/13/2019    Performed by Wolf Caldera [facility] at [facility] CATH    GXCQHG-dysblhgypxzh-qvwjpxexasb tube bilateral N/A 9/14/2018    Performed by Wolf [facility] at [facility] CATH    CLOSURE-ILEOSTOMY N/A 1/9/2017    Per[facility] at [facility] OR    COLON SURGERY  11/17/2016    colon resection, ileostomy & reversal    COLONOSCOPY N/A 11/20/2018    Performed by Rocky Delgado MD at [facility] ENDO    CORONARY STENT PLACEMENT  1997    Creation, Nephrostomy, Percutaneous bilateral changes in cath lab N/A 4/3/2019    Performed by [facility]ra Bruce [facility] at [facility] CATH    Creation, Nephrostomy, Percutaneous exchange in cath lab- RM # [id number] N/A 1/30/2019    Performed by Wolf [facility]a [facility] at [facility] CATH    CYSTOSCOPY, FLEXIBLE N/A 1/31/2019    Performed by [facility]rry Shultz [facility] at [facility] OR    CYSTOSCOPY, WITH BLADDER BIOPSY N/A 8/11/2019    Performed by [facility]ultz [facility] at [facility] OR    DRAINAGE bilateral neph tube change in cath lab N/A 4/17/2018    [facility]    DRAINAGE neph tube change bilateral to be done in cath lab N/A 1/4/2018    Performed by [facility]oso [facility] at [facility] CATH    Drainage neph tube exchange bilateral in cath lab N/A 11/29/2018    Performed by [facility] Bruce [facility] at [facility] CATH    EYE SURGERY      bilat cataract, detached retina bilat, blind in left eye     Incision and drainage right buttock abscess Right 10/29/15    INSERTION, SUPRAPUBIC CATHETER with drainage of suprapubic region  1/31/2019    Performed by [facility]ry [facility]ultz [facility] at [facility] OR    INSERTION-CATHETER-SUPRAPUBIC N/A 5/17/2016    Performed by [facility]ultz [facility] at [facility] OR    Neph Tube Change N/A 4/24/2018    Performed by Wolf [facility] at [facility] CATH    NEPHROSTOMY Bilateral      QWWPQCWCS-AIKHXFNJKKTU-RVICPEXTYHS TUBE N/A 9/27/2017    Per[facility] at [facility] CATH    Qzrcrjuef-dfoypcfsexbu-ikjjdqrnbcn tube change tubes N/A 7/10/2018    Performed by [facility]ra Bruce [facility] at [facility] CATH    RESECTION-COLON AND RECTAL-LOW ANTERIOR N/A 11/17/2016    Performed by [facility]    super pubic catheter      TONSILLECTOMY      Age 1    TURBT, USING BIPOLAR CAUTERY N/A 8/11/2019    Performed by [facility]ultz [facility] at [facility] OR    ULTRASOUND-ENDOSCOPIC-LOWER Left 11/7/2016    Performed by Rocky Delgado MD at [facility] ENDO    VASECTOMY      vasectomy reversed         Family History  Family History   Problem Relation Age of Onset    Alzheimer's disease Mother     Mental illness Mother     Diabetes Mother     Hypertension Mother     Hearing loss Mother     Alzheimer's disease Father     Hearing loss Father     Arthritis Father        Social History  Social History     Socioeconomic History    Marital status:      Spouse name: Not on file    Number of children: Not on file    Years of education: Not on file    Highest education level: Not on file   Occupational History    Not on file   Social Needs    Financial resource strain: Not on file    Food insecurity:     Worry: Not on file     Inability: Not on file    Transportation needs:     Medical: Not on file     Non-medical: Not on file   Tobacco Use    Smoking status: Former Smoker     Packs/day: 1.00     Years: 40.00     Pack years: 40.00     Last attempt to quit: 1/1/2004     Years since quitting: 15.6    Smokeless tobacco: Never Used   Substance and Sexual Activity    Alcohol use: Yes     Alcohol/week: 0.6 oz     Types: 1 Cans of beer per week     Comment: 1 can daily for weight gain per      Drug use: No    Sexual activity: Yes     Partners: Female   Lifestyle    Physical activity:     Days per week: Not on file     Minutes per session: Not on file    Stress: Not on file    Relationships    Social connections:     Talks on phone: Not on file     Gets together: Not on file     Attends [employer] service: Not on file     Active member of club or organization: Not on file     Attends meetings of clubs or organizations: Not on file     Relationship status: Not on file   Other Topics Concern    Not on file   Social History Narrative    Not on file        Review of Systems  14-point review of systems as follows:   No check charles indicates NEGATIVE response   Constitutional: [] weight loss, [] change to appetite   Eyes: [] change in vision, [] double vision   Ears, nose, mouth, throat: [] frequent nose bleeds, [] ringing in the ears   Respiratory: [x] cough, [] wheezing   Cardiovascular: [] chest pain, [] palpitations   Gastrointestinal: [] jaundice, [] nausea/vomiting   Genitourinary: [] incontinence, [] burning with urination   Hematologic/lymphatic: [x] easy bruising/bleeding, [] night sweats   Neurological: [x] numbness, [] weakness   Endocrine: [] fatigue, [] heat/cold intolerance   Allergy/Immunologic: [] fevers, [] chills   Musculoskeletal: [] muscle pain, [] joint pain   Psychiatric: [] thoughts of harming self/others, [] depression   Integumentary: [] rashes, [] sores that do not heal       Objective:        Vitals:    08/28/19 1053   BP: 134/71   Pulse: 74   Resp: 18     Body mass index is 27.75 kg/m².    PHYSICAL EXAM:  Mr. Quinones is in a wheelchair.  He appears fatigued and he speaks slow  There is no pain on palpation of right shoulder, AC joint.  There is mild pain on palpation of the right biceps tendon.  Negative - impingement test, [place], intraspinatus, supraspinatus   Elbow palpation normal    PREVIOUS:   NEUROLOGICAL EXAM:  Mental status: Awake, alert, and oriented to person, place, and time. Recent and remote memory appear to be intact. MOCA 27/30 (normal >26)  Speech/Language: No aphasia on conversation. Intermittently, speech is slightly slurred. Cranial nerves:  Visual fields of right eye full, left eye legally blind. Pupils equal round and reactive to light, extraocular movements intact, facial strength intact bilaterally, facial sensation V1-V3 intact bilaterally, palate symmetric, tongue midline, hearing grossly intact bilaterally.  Motor: no pronator drift. No orbit. 5 out of 5 strength throughout the upper and lower extremities bilaterally. Normal bulk and tone. No tremor. No asterixis.   Sensation: Intact to light touch bilaterally.  DTR: 1+ at the knees and biceps bilaterally.  Coordination: Finger-nose-finger testing normal bilaterally.  Gait: normal          Data Review:     I have personally reviewed the referring provider's notes, labs, & imaging made available to me today.     12/4/17 EEG spot - normal     12/2/17 CTA head / neck - normal     Results for orders placed or performed during the hospital encounter of 12/01/17   MRI Brain Without Contrast    Narrative    Indication: Neurologic deficit, slurred speech, altered mental status.    Procedure:    MRI brain without contrast.    Comparison examination: Noncontrast CT of the brain: 12/1/2017    Technique:    Routine fat and water-weighted sequences were obtained.    Findings:    Limited study secondary to motion artifact.  There is advanced generalized atrophy both centrally and over the convexities without midline shift or mass effect.  Areas of increased T2 and flair weighted signal change about the ventricular white matter, basal ganglia as well as the brainstem are consistent with areas of remote ischemia and/or demyelinization.    The diffusion sequence demonstrates a small, 5 mm linear focus of diffusion restriction at the level of the left occipital lobe, image 72 series 8.  This is increased in signal on the flair weighted sequence and may simply represent artifactual change, a tiny focal area of subacute infarct would be in the differential.    Normal signal flow-voids present about the skull  base.    No acute hemorrhage on the T1 sequence.    Impression    1.  Small focal area of increased diffusion restriction which, although may be artifactual, likely represents an area of subacute infarction within the left occipital lobe but without underlying parenchymal hemorrhage, or midline shift.    2.  Remote ischemic changes with advanced generalized atrophy.      Electronically signed by: MADELINE DHILLON MD  Date:     12/02/17  Time:    11:17    CT Head Without Contrast    Narrative    CT HEAD WITHOUT CONTRAST: 12/01/2017 at 1336    HISTORY: Clinical history is slurred speech, history of colorectal and prostate cancer.  No history of recent trauma or recent surgery is provided.    Comparison: CT head 02/02/2017.    TECHNIQUE: Multiple contiguous axial images were acquired from the base of the skull and the vertex without contrast administration. Total DLP for the study is approximately 586 mGy-cm. Automatic exposure control was utilized.    FINDINGS:No mass effect, hemorrhage or hydrocephalus is appreciated. Symmetric appearance of the soft tissues, orbits. No significant mucosal thickening or paranasal sinus fluid levels are appreciated. No displaced fracture or dislocation is appreciated. Motion, streak artifact is present. Mild volume loss and chronic appearing periventricular white matter changes are appreciated.  There are dense choroid calcifications as well as dural calcification similar overall with mild atherosclerotic vascular calcifications and partially empty sella variant.  There are some minimal central lacunar changes present similar also. Correlate overall with the patient's clinical findings. No significant interval changes are appreciated.    Impression     No interval mass effect, displaced fracture or hemorrhage is appreciated.  No significant interval intracranial changes are appreciated.        Electronically signed by: CAROL NERI MD  Date:     12/01/17  Time:    13:43    Results for  orders placed or performed during the hospital encounter of 02/02/17   CT Head W Wo Contrast    Narrative    CT HEAD WITH AND WITHOUT CONTRAST:    HISTORY:  Frequent falls, confusion, prostate cancer with bone metastasis.    TECHNIQUE: Multiple contiguous axial images were acquired from the base of the skull to the vertex before and following intravenous contrast administration.  Coronal and sagittal reformations were obtained.  Total DLP for the study is approximately 1243 mGy-cm.  Automated exposure control was utilized to reduce radiation dose.      COMPARISON: None available    FINDINGS:    There is diffuse parenchymal atrophy..  There is patchy periventricular and deep white matter hypoattenuation compatible with chronic micro-ischemia.  There is intracranial atherosclerosis.  No evidence of acute intracranial hemorrhage, mass effect, midline deviation, hydrocephalus, or abnormal extra-axial fluid collection is seen. No evidence of acute large vessel territory ischemia is seen. The basilar cisterns are preserved.  No pathologic enhancement is identified.  The visualized paranasal sinuses are clear.  The mastoid air cells are grossly clear.  No acute displaced calvarial abnormality is appreciated.    Impression    1. No acute intracranial process is identified.  2.  No pathologic enhancement identified.      Electronically signed by: CAS ALMANZA MD  Date:     02/02/17  Time:    12:27        Lab Results   Component Value Date     (L) 08/21/2019    K 4.3 08/21/2019    MG 1.8 09/14/2018    CL 91 (L) 08/21/2019    CO2 23 08/21/2019    BUN 16 08/21/2019    CREATININE 0.83 08/21/2019    GLU 97 08/21/2019    HGBA1C 5.1 12/01/2017    AST 19 08/21/2019    AST 32 04/27/2016    ALT 9 (L) 08/21/2019    ALBUMIN 4.0 08/21/2019    PROT 6.9 08/21/2019    BILITOT 0.4 08/21/2019    CHOL 154 12/01/2017    HDL 27 (L) 12/01/2017    LDLCALC 83.0 12/01/2017    TRIG 220 (H) 12/01/2017       Lab Results   Component Value Date     WBC 6.21 08/21/2019    HGB 10.3 (L) 08/21/2019    HCT 31.9 (L) 08/21/2019    MCV 81 (L) 08/21/2019     08/21/2019       Lab Results   Component Value Date    TSH 2.170 02/23/2018       Assessment & Plan:       Problem List Items Addressed This Visit        Neuro    Trigeminal neuralgia of right side of face    Overview     Doing well on low-dose Trileptal at night         History of stroke    Overview     He has numerous other embolic looking strokes of different ages on his head CT and brain MRI - there is a subacute appearing left occipital lobe stroke in the distribution of the left PCA which can not account for any of the symptoms he presented with and likely happened in the recent days to weeks as a subclinical event (he reported no change to the right visual field), there is a left cerebellar hemisphere old stroke (encephalomalacia) and a right deep frontal lobe old lacunar stroke (encephalomalacia) both which occurred prior to 2/2017 as they were already present on CT head then, despite Mr. Quinones being on aspirin at that time.               Relevant Orders    LIPID PANEL       Cardiac/Vascular    Mixed hyperlipidemia    Overview     LDL 83. Continue simvastatin 40mg. Will need to recheck next visit          Relevant Orders    LIPID PANEL       Orthopedic    Right arm pain - Primary    Overview     No significant neck pathology  Non radicular  Rotator cuff exam negative  Elbow exam negative  No mets on PET scan    Probably musculoskeletal  Trial of Voltaren gel         Relevant Medications    diclofenac sodium (VOLTAREN) 1 % Gel            .     TESTING:  -- repeat lipid panel with your next labs (ordered)    PLAN:  -- START Voltaren gel for your right arm   -- continue rosuvastatin 20mg daily (for improved stroke protection given you had to stop aspirin)   -- continue Trileptal (oxcarbazepime) 300mg at night - OK to move to 300mg twice a day or 150/150/300mg as tolerated and if needed for pain   --  may need to add on a long acting opiate like Morphine to your Norco or change the Norco to Percocet  -- from the stroke standpoint, I am ok with you using prophylactic Lovenox during times of little mobility     Follow up in about 6 months (around 2/28/2020).      Jessica Bernardo MD

## 2019-09-05 NOTE — TELEPHONE ENCOUNTER
----- Message from June Meza sent at 9/5/2019  3:00 PM CDT -----  Type:  RX Refill Request    Who Called:  Tequila Quinones  Refill or New Rx:  refill  RX Name and Strength:  rosuvastatin (CRESTOR) 20 MG tablet  How is the patient currently taking it? (ex. 1XDay):     Is this a 30 day or 90 day RX:     Preferred Pharmacy with phone number:    [address] [address]  Phone: [phone] Fax: [phone]          Local or Mail Order:  Local   Ordering Provider:  Dr Az Jerez Call Back Number:  [phone] (home)     Additional Information:  If the med will be discontinued / please advise

## 2019-09-06 RX ORDER — ROSUVASTATIN CALCIUM 20 MG/1
20 TABLET, COATED ORAL DAILY
Qty: 30 TABLET | Refills: 11 | Status: SHIPPED | OUTPATIENT
Start: 2019-09-06 | End: 2019-09-11 | Stop reason: SDUPTHER

## 2019-09-11 ENCOUNTER — TELEPHONE (OUTPATIENT)
Dept: NEUROLOGY | Facility: CLINIC | Age: 74
End: 2019-09-11

## 2019-09-11 RX ORDER — ROSUVASTATIN CALCIUM 20 MG/1
20 TABLET, COATED ORAL DAILY
Qty: 30 TABLET | Refills: 11 | Status: SHIPPED | OUTPATIENT
Start: 2019-09-11 | End: 2020-09-10

## 2019-09-11 NOTE — TELEPHONE ENCOUNTER
----- Message from Kareen Lee sent at 9/11/2019 10:28 AM CDT -----  Contact: Anna-wife  Pt wife Anna berg states that she had sent a message over on Friday and the pharmacy had faxed over a request for the pt rosuvastatin (CRESTOR) 20 MG tablet,pt has been out of this medication for 1 week,the pharmacy or her have not got a response. Spouse is needing an answer today please if pt need to continue taking this medication or what.Can be reached at [phone]      .  [facility], [address] [address]  Phone: [phone] Fax: [phone]

## 2019-09-11 NOTE — TELEPHONE ENCOUNTER
Returned call and spoke with patient's wife, Anna. Informed her that the medication had been sent over. Anna verbalized understanding.

## 2019-09-11 NOTE — TELEPHONE ENCOUNTER
Spoke with patient's wife, stated we have not received any messages or request but I will send this request to Dr. Bernardo. Patient's wife expressed understanding.

## 2019-09-18 ENCOUNTER — INFUSION (OUTPATIENT)
Dept: INFUSION THERAPY | Facility: HOSPITAL | Age: 74
End: 2019-09-18
Attending: INTERNAL MEDICINE
Payer: MEDICARE

## 2019-09-18 ENCOUNTER — LAB VISIT (OUTPATIENT)
Dept: LAB | Facility: HOSPITAL | Age: 74
End: 2019-09-18
Attending: PHYSICIAN ASSISTANT
Payer: MEDICARE

## 2019-09-18 DIAGNOSIS — C61 MALIGNANT NEOPLASM OF PROSTATE: ICD-10-CM

## 2019-09-18 LAB
ALBUMIN SERPL BCP-MCNC: 3.6 G/DL (ref 3.5–5.2)
ALP SERPL-CCNC: 96 U/L (ref 38–145)
ALT SERPL W/O P-5'-P-CCNC: 15 U/L (ref 10–44)
ANION GAP SERPL CALC-SCNC: 8 MMOL/L (ref 8–16)
AST SERPL-CCNC: 16 U/L (ref 17–59)
BASOPHILS # BLD AUTO: 0.03 K/UL (ref 0–0.2)
BASOPHILS NFR BLD: 0.4 % (ref 0–1.9)
BILIRUB SERPL-MCNC: 0.4 MG/DL (ref 0.2–1.3)
BUN SERPL-MCNC: 16 MG/DL (ref 9–21)
CALCIUM SERPL-MCNC: 8.9 MG/DL (ref 8.4–10.2)
CHLORIDE SERPL-SCNC: 97 MMOL/L (ref 95–110)
CO2 SERPL-SCNC: 27 MMOL/L (ref 22–31)
COMPLEXED PSA SERPL-MCNC: 3.1 NG/ML (ref 0–4)
CREAT SERPL-MCNC: 0.79 MG/DL (ref 0.5–1.4)
DIFFERENTIAL METHOD: ABNORMAL
EOSINOPHIL # BLD AUTO: 0.1 K/UL (ref 0–0.5)
EOSINOPHIL NFR BLD: 1.9 % (ref 0–8)
ERYTHROCYTE [DISTWIDTH] IN BLOOD BY AUTOMATED COUNT: 17.9 % (ref 11.5–14.5)
EST. GFR  (AFRICAN AMERICAN): >60 ML/MIN/1.73 M^2
EST. GFR  (NON AFRICAN AMERICAN): >60 ML/MIN/1.73 M^2
GLUCOSE SERPL-MCNC: 98 MG/DL (ref 70–110)
HCT VFR BLD AUTO: 32.3 % (ref 40–54)
HGB BLD-MCNC: 10.5 G/DL (ref 14–18)
IMM GRANULOCYTES # BLD AUTO: 0.03 K/UL (ref 0–0.04)
IMM GRANULOCYTES NFR BLD AUTO: 0.4 % (ref 0–0.5)
LYMPHOCYTES # BLD AUTO: 0.6 K/UL (ref 1–4.8)
LYMPHOCYTES NFR BLD: 8.9 % (ref 18–48)
MCH RBC QN AUTO: 26.6 PG (ref 27–31)
MCHC RBC AUTO-ENTMCNC: 32.5 G/DL (ref 32–36)
MCV RBC AUTO: 82 FL (ref 82–98)
MONOCYTES # BLD AUTO: 0.6 K/UL (ref 0.3–1)
MONOCYTES NFR BLD: 8.4 % (ref 4–15)
NEUTROPHILS # BLD AUTO: 5.6 K/UL (ref 1.8–7.7)
NEUTROPHILS NFR BLD: 80 % (ref 38–73)
NRBC BLD-RTO: 0 /100 WBC
PLATELET # BLD AUTO: 247 K/UL (ref 150–350)
PMV BLD AUTO: 8.7 FL (ref 9.2–12.9)
POTASSIUM SERPL-SCNC: 4.4 MMOL/L (ref 3.5–5.1)
PROT SERPL-MCNC: 6.5 G/DL (ref 6–8.4)
RBC # BLD AUTO: 3.94 M/UL (ref 4.6–6.2)
SODIUM SERPL-SCNC: 132 MMOL/L (ref 136–145)
WBC # BLD AUTO: 6.94 K/UL (ref 3.9–12.7)

## 2019-09-18 PROCEDURE — 84153 ASSAY OF PSA TOTAL: CPT

## 2019-09-18 PROCEDURE — 80053 COMPREHEN METABOLIC PANEL: CPT

## 2019-09-18 PROCEDURE — 85025 COMPLETE CBC W/AUTO DIFF WBC: CPT

## 2019-09-18 PROCEDURE — 84153 ASSAY OF PSA TOTAL: CPT | Mod: PN

## 2019-09-18 PROCEDURE — 80053 COMPREHEN METABOLIC PANEL: CPT | Mod: PN

## 2019-09-18 PROCEDURE — 36415 COLL VENOUS BLD VENIPUNCTURE: CPT | Mod: PN

## 2019-09-18 PROCEDURE — 85025 COMPLETE CBC W/AUTO DIFF WBC: CPT | Mod: PN

## 2019-09-18 NOTE — PLAN OF CARE
Problem: Adult Inpatient Plan of Care  Goal: Plan of Care Review  Outcome: Ongoing (interventions implemented as appropriate)  Hgb 10.5, procrit not given per order.

## 2019-10-08 ENCOUNTER — TELEPHONE (OUTPATIENT)
Dept: HOME HEALTH SERVICES | Facility: HOSPITAL | Age: 74
End: 2019-10-08
